# Patient Record
Sex: FEMALE | NOT HISPANIC OR LATINO | Employment: FULL TIME | URBAN - METROPOLITAN AREA
[De-identification: names, ages, dates, MRNs, and addresses within clinical notes are randomized per-mention and may not be internally consistent; named-entity substitution may affect disease eponyms.]

---

## 2023-02-24 LAB — SARS-COV-2 RESULT: NOT DETECTED

## 2023-10-19 ENCOUNTER — LAB REQUISITION (OUTPATIENT)
Dept: LAB | Facility: HOSPITAL | Age: 28
End: 2023-10-19

## 2023-10-19 DIAGNOSIS — J06.9 ACUTE UPPER RESPIRATORY INFECTION, UNSPECIFIED: ICD-10-CM

## 2023-10-19 PROCEDURE — 87635 SARS-COV-2 COVID-19 AMP PRB: CPT

## 2023-10-20 ENCOUNTER — LAB REQUISITION (OUTPATIENT)
Dept: LAB | Facility: HOSPITAL | Age: 28
End: 2023-10-20
Payer: COMMERCIAL

## 2023-10-20 DIAGNOSIS — J06.9 ACUTE UPPER RESPIRATORY INFECTION, UNSPECIFIED: ICD-10-CM

## 2023-10-20 LAB — SARS-COV-2 RNA RESP QL NAA+PROBE: NOT DETECTED

## 2023-11-20 ENCOUNTER — APPOINTMENT (OUTPATIENT)
Dept: OBSTETRICS AND GYNECOLOGY | Facility: CLINIC | Age: 28
End: 2023-11-20
Payer: COMMERCIAL

## 2023-11-22 ENCOUNTER — APPOINTMENT (OUTPATIENT)
Dept: OBSTETRICS AND GYNECOLOGY | Facility: CLINIC | Age: 28
End: 2023-11-22
Payer: COMMERCIAL

## 2023-11-27 ENCOUNTER — INITIAL PRENATAL (OUTPATIENT)
Dept: OBSTETRICS AND GYNECOLOGY | Facility: CLINIC | Age: 28
End: 2023-11-27
Payer: COMMERCIAL

## 2023-11-27 VITALS — WEIGHT: 144.2 LBS | BODY MASS INDEX: 24.75 KG/M2 | DIASTOLIC BLOOD PRESSURE: 83 MMHG | SYSTOLIC BLOOD PRESSURE: 119 MMHG

## 2023-11-27 DIAGNOSIS — Z34.81 ENCOUNTER FOR SUPERVISION OF OTHER NORMAL PREGNANCY IN FIRST TRIMESTER (HHS-HCC): Primary | ICD-10-CM

## 2023-11-27 PROCEDURE — 87800 DETECT AGNT MULT DNA DIREC: CPT | Performed by: ADVANCED PRACTICE MIDWIFE

## 2023-11-27 PROCEDURE — 87086 URINE CULTURE/COLONY COUNT: CPT | Performed by: ADVANCED PRACTICE MIDWIFE

## 2023-11-27 PROCEDURE — 0500F INITIAL PRENATAL CARE VISIT: CPT | Performed by: ADVANCED PRACTICE MIDWIFE

## 2023-11-27 ASSESSMENT — EDINBURGH POSTNATAL DEPRESSION SCALE (EPDS)
I HAVE FELT SCARED OR PANICKY FOR NO GOOD REASON: NO, NOT MUCH
I HAVE BEEN ANXIOUS OR WORRIED FOR NO GOOD REASON: HARDLY EVER
I HAVE LOOKED FORWARD WITH ENJOYMENT TO THINGS: AS MUCH AS I EVER DID
I HAVE BLAMED MYSELF UNNECESSARILY WHEN THINGS WENT WRONG: NO, NEVER
I HAVE BEEN SO UNHAPPY THAT I HAVE HAD DIFFICULTY SLEEPING: NOT VERY OFTEN
I HAVE FELT SAD OR MISERABLE: NO, NOT AT ALL
I HAVE BEEN SO UNHAPPY THAT I HAVE BEEN CRYING: NO, NEVER
TOTAL SCORE: 4
I HAVE BEEN ABLE TO LAUGH AND SEE THE FUNNY SIDE OF THINGS: AS MUCH AS I ALWAYS COULD
THINGS HAVE BEEN GETTING ON TOP OF ME: NO, MOST OF THE TIME I HAVE COPED QUITE WELL
THE THOUGHT OF HARMING MYSELF HAS OCCURRED TO ME: NEVER

## 2023-11-27 NOTE — PROGRESS NOTES
Subjective   Patient ID [id number]   Radha Epstein is a 28 y.o.  at Unknown with a working estimated date of delivery of Not found. who presents for an initial prenatal visit. This pregnancy is planned.      Endorses some nausea, no vomiting. Declines anti-emetics.  Denies abd pain or VB.   Fairly certain on LMP, regular monthly periods.   Not yet taking PNVs, awaiting their delivery. Has been taking  vitamins since having daughter.     Her pregnancy is complicated by:  -thus far uncomplicated    OB History    Para Term  AB Living   2 1 1     1   SAB IAB Ectopic Multiple Live Births           1      # Outcome Date GA Lbr Vasu/2nd Weight Sex Delivery Anes PTL Lv   2 Current            1 Term 23    F Vag-Spont EPI N JASPAL     [place]  Depression Scale Total: 4    Objective   Physical Exam  Weight: 65.4 kg (144 lb 3.2 oz)  Expected Total Weight Gain: Could not be calculated   Pregravid BMI: Could not be calculated  BP: 119/83          Physical Exam  Constitutional:       Appearance: Normal appearance.   Pulmonary:      Effort: Pulmonary effort is normal.   Abdominal:      General: Abdomen is flat. There is no distension.      Palpations: Abdomen is soft.      Tenderness: There is no abdominal tenderness.   Neurological:      General: No focal deficit present.      Mental Status: She is alert and oriented to person, place, and time.   Psychiatric:         Mood and Affect: Mood normal.         Behavior: Behavior normal.         Prenatal Labs  Ordered, standard panel    Assessment/Plan       Prenatal Labs ordered.  Viability scan ordered.  Recommended pt confirm her  vitamins have sufficient folic acid or to  generic PNV from the store today if not.  First trimester screening and second trimester screening discussed. Patient decided to proceed with cfDNA.  Return precautions reviewed.  Follow up in 4 weeks for return OB visit.

## 2023-11-28 ENCOUNTER — TELEPHONE (OUTPATIENT)
Dept: RADIOLOGY | Facility: CLINIC | Age: 28
End: 2023-11-28
Payer: COMMERCIAL

## 2023-11-28 ENCOUNTER — APPOINTMENT (OUTPATIENT)
Dept: RADIOLOGY | Facility: CLINIC | Age: 28
End: 2023-11-28
Payer: COMMERCIAL

## 2023-11-28 LAB
C TRACH RRNA SPEC QL NAA+PROBE: NEGATIVE
N GONORRHOEA DNA SPEC QL PROBE+SIG AMP: NEGATIVE

## 2023-11-28 NOTE — TELEPHONE ENCOUNTER
This patient wants to talk to a nurse cause her  has covid but she test negative for it but she need to talk to her nurse

## 2023-11-28 NOTE — TELEPHONE ENCOUNTER
Spoke with patient states her  has COVID, she has tested negative twice.  Has had mild cold symptoms since Thursday/Friday, including congestion and cough, denies fever.  Reviewed medications safe in pregnancy including zrytec, mucinex, delsym and tylenol. Patient advised to call back if worsening symptoms.

## 2023-11-29 LAB — BACTERIA UR CULT: NO GROWTH

## 2023-12-05 ENCOUNTER — APPOINTMENT (OUTPATIENT)
Dept: RADIOLOGY | Facility: CLINIC | Age: 28
End: 2023-12-05
Payer: COMMERCIAL

## 2023-12-08 ENCOUNTER — ANCILLARY PROCEDURE (OUTPATIENT)
Dept: RADIOLOGY | Facility: CLINIC | Age: 28
End: 2023-12-08
Payer: COMMERCIAL

## 2023-12-08 DIAGNOSIS — Z34.81 ENCOUNTER FOR SUPERVISION OF OTHER NORMAL PREGNANCY IN FIRST TRIMESTER (HHS-HCC): ICD-10-CM

## 2023-12-08 PROCEDURE — 76801 OB US < 14 WKS SINGLE FETUS: CPT

## 2023-12-08 PROCEDURE — 76801 OB US < 14 WKS SINGLE FETUS: CPT | Performed by: OBSTETRICS & GYNECOLOGY

## 2023-12-11 ENCOUNTER — TRANSCRIBE ORDERS (OUTPATIENT)
Dept: OBSTETRICS AND GYNECOLOGY | Facility: CLINIC | Age: 28
End: 2023-12-11
Payer: COMMERCIAL

## 2023-12-11 DIAGNOSIS — Z32.01 PREGNANCY TEST POSITIVE (HHS-HCC): Primary | ICD-10-CM

## 2023-12-13 ENCOUNTER — LAB (OUTPATIENT)
Dept: LAB | Facility: LAB | Age: 28
End: 2023-12-13
Payer: COMMERCIAL

## 2023-12-13 DIAGNOSIS — Z34.81 ENCOUNTER FOR SUPERVISION OF OTHER NORMAL PREGNANCY IN FIRST TRIMESTER (HHS-HCC): ICD-10-CM

## 2023-12-13 LAB
ABO GROUP (TYPE) IN BLOOD: NORMAL
ANTIBODY SCREEN: NORMAL
ERYTHROCYTE [DISTWIDTH] IN BLOOD BY AUTOMATED COUNT: 11.5 % (ref 11.5–14.5)
HBV SURFACE AG SERPL QL IA: NONREACTIVE
HCT VFR BLD AUTO: 38.2 % (ref 36–46)
HCV AB SER QL: NONREACTIVE
HGB BLD-MCNC: 13.2 G/DL (ref 12–16)
HIV 1+2 AB+HIV1 P24 AG SERPL QL IA: NONREACTIVE
MCH RBC QN AUTO: 31 PG (ref 26–34)
MCHC RBC AUTO-ENTMCNC: 34.6 G/DL (ref 32–36)
MCV RBC AUTO: 90 FL (ref 80–100)
NRBC BLD-RTO: 0 /100 WBCS (ref 0–0)
PLATELET # BLD AUTO: 237 X10*3/UL (ref 150–450)
RBC # BLD AUTO: 4.26 X10*6/UL (ref 4–5.2)
REFLEX ADDED, ANEMIA PANEL: NORMAL
RH FACTOR (ANTIGEN D): NORMAL
RUBV IGG SERPL IA-ACNC: 0.6 IA
RUBV IGG SERPL QL IA: NEGATIVE
T PALLIDUM AB SER QL: NONREACTIVE
WBC # BLD AUTO: 6.7 X10*3/UL (ref 4.4–11.3)

## 2023-12-13 PROCEDURE — 87389 HIV-1 AG W/HIV-1&-2 AB AG IA: CPT

## 2023-12-13 PROCEDURE — 36415 COLL VENOUS BLD VENIPUNCTURE: CPT

## 2023-12-13 PROCEDURE — 86900 BLOOD TYPING SEROLOGIC ABO: CPT

## 2023-12-13 PROCEDURE — 86850 RBC ANTIBODY SCREEN: CPT

## 2023-12-13 PROCEDURE — 87340 HEPATITIS B SURFACE AG IA: CPT

## 2023-12-13 PROCEDURE — 86803 HEPATITIS C AB TEST: CPT

## 2023-12-13 PROCEDURE — 85027 COMPLETE CBC AUTOMATED: CPT

## 2023-12-13 PROCEDURE — 86780 TREPONEMA PALLIDUM: CPT

## 2023-12-13 PROCEDURE — 86901 BLOOD TYPING SEROLOGIC RH(D): CPT

## 2023-12-13 PROCEDURE — 86317 IMMUNOASSAY INFECTIOUS AGENT: CPT

## 2023-12-18 ENCOUNTER — APPOINTMENT (OUTPATIENT)
Dept: OBSTETRICS AND GYNECOLOGY | Facility: CLINIC | Age: 28
End: 2023-12-18
Payer: COMMERCIAL

## 2023-12-22 NOTE — PROGRESS NOTES
Assessment/Plan   Problem List Items Addressed This Visit    None  Visit Diagnoses         Codes    Prenatal care, subsequent pregnancy in first trimester    -  Primary Z34.81    12 weeks gestation of pregnancy     Z3A.12            Reviewed NIPT results  Follow up in 4 weeks for a routine prenatal visit.    JERSEY Lester-MATTHIAS    Subjective   Radha Epstein is a 28 y.o.  at 11w4d with a working estimated date of delivery of 2024, by Ultrasound who presents for a routine prenatal visit. She denies vaginal bleeding or abdominal pain.  Still having some nausea and fatigue.    Her pregnancy is complicated by:  Pregnancy Problems (from 23 to present)       No problems associated with this episode.             Objective   Physical Exam   , Pregravid BMI: 24.71  TW.091 kg (3.2 oz)   Expected Total Weight Gain: 11.5 kg (25 lb)-16 kg (35 lb)             Postpartum Depression: Low Risk  (2023)    [place]  Depression Scale     Last EPDS Total Score: 4     Last EPDS Self Harm Result: Never        Prenatal Labs  Lab Results   Component Value Date    HGB 13.2 2023    HCT 38.2 2023     2023    ABO O 2023    LABRH POS 2023    NEISSGONOAMP Negative 2023    CHLAMTRACAMP Negative 2023    SYPHT Nonreactive 2023    HEPBSAG Nonreactive 2023    HIV1X2 Nonreactive 2023    URINECULTURE No growth 2023     NIPT: completed and resulted    Education provided   Ligament pain does hurt!  It also tells us that the uterus is growing appropriately - remember your maternity          support belt.  2.    Nausea and vomiting usually lessen by 12 weeks, completely goes away by 16 weeks  3.    Headaches are common and Tylenol is ok to take during pregnancy as long as you take less than 4 grams per day  4.    Make sure you are drinking plenty of water

## 2023-12-26 ENCOUNTER — ROUTINE PRENATAL (OUTPATIENT)
Dept: OBSTETRICS AND GYNECOLOGY | Facility: CLINIC | Age: 28
End: 2023-12-26
Payer: COMMERCIAL

## 2023-12-26 VITALS — WEIGHT: 143 LBS | SYSTOLIC BLOOD PRESSURE: 104 MMHG | DIASTOLIC BLOOD PRESSURE: 66 MMHG | BODY MASS INDEX: 24.55 KG/M2

## 2023-12-26 DIAGNOSIS — Z34.81 PRENATAL CARE, SUBSEQUENT PREGNANCY IN FIRST TRIMESTER (HHS-HCC): Primary | ICD-10-CM

## 2023-12-26 DIAGNOSIS — Z3A.12 12 WEEKS GESTATION OF PREGNANCY (HHS-HCC): ICD-10-CM

## 2023-12-26 PROCEDURE — 0501F PRENATAL FLOW SHEET: CPT | Performed by: ADVANCED PRACTICE MIDWIFE

## 2023-12-28 ENCOUNTER — APPOINTMENT (OUTPATIENT)
Dept: RADIOLOGY | Facility: CLINIC | Age: 28
End: 2023-12-28
Payer: COMMERCIAL

## 2024-01-03 ENCOUNTER — PROCEDURE VISIT (OUTPATIENT)
Dept: RADIOLOGY | Facility: CLINIC | Age: 29
End: 2024-01-03
Payer: COMMERCIAL

## 2024-01-03 DIAGNOSIS — Z32.01 PREGNANCY TEST POSITIVE (HHS-HCC): ICD-10-CM

## 2024-01-03 PROCEDURE — 76816 OB US FOLLOW-UP PER FETUS: CPT

## 2024-01-03 PROCEDURE — 76816 OB US FOLLOW-UP PER FETUS: CPT | Performed by: OBSTETRICS & GYNECOLOGY

## 2024-01-08 ENCOUNTER — ROUTINE PRENATAL (OUTPATIENT)
Dept: OBSTETRICS AND GYNECOLOGY | Facility: CLINIC | Age: 29
End: 2024-01-08
Payer: COMMERCIAL

## 2024-01-08 VITALS — DIASTOLIC BLOOD PRESSURE: 74 MMHG | WEIGHT: 144.3 LBS | SYSTOLIC BLOOD PRESSURE: 109 MMHG | BODY MASS INDEX: 24.77 KG/M2

## 2024-01-08 DIAGNOSIS — Z34.81 PRENATAL CARE, SUBSEQUENT PREGNANCY IN FIRST TRIMESTER (HHS-HCC): Primary | ICD-10-CM

## 2024-01-08 DIAGNOSIS — Z3A.14 14 WEEKS GESTATION OF PREGNANCY (HHS-HCC): ICD-10-CM

## 2024-01-08 PROCEDURE — 0501F PRENATAL FLOW SHEET: CPT | Performed by: ADVANCED PRACTICE MIDWIFE

## 2024-01-08 NOTE — PATIENT INSTRUCTIONS
TAKING GOOD CARE OF YOURSELF WHILE YOU ARE PREGNANT    What Should I Eat?  You do not have to eat a lot more food during pregnancy. But it is important to eat the right food--the most  healthy food for you and your baby. Every day, make sure you have:  6 to 8 large glasses of water.  6 to 9 servings of whole grain foods like bread or pasta. By reading the label, you will know that you are getting ‘‘whole’’ grain and not just brown-colored bread or pasta (1 slice of bread or a half cup of cooked pasta is a serving).  3 to 4 servings of fruit. Fresh, raw fruit is best (1 small apple or a half cup of chopped fruit is a serving).  4 to 5 servings of vegetables (1 medium carrot or a half cup of chopped vegetables is a serving).  2 to 3 servings of lean meat, fish, eggs, or nuts. (A piece ofmeat the size of a pack of playing cards is 1 serving.)  1 serving of vitamin C-rich food, like oranges, sweet peppers, or tomatoes (one half cup is a serving).  2 to 3 servings of iron-rich foods, like black-eyed peas, sweet potatoes, greens, dried fruit, or meat.  1 serving of a food rich in folic acid, like dark green, leafy vegetables (one half cup is a serving).    Are Some Foods Dangerous?  Most women can eat any food they want while they are pregnant. But there are some foods that can be  dangerous to the health of your baby.    Fish -- Fish is good food. And it is an important food for growing a smart baby. But some fish have lots of dangerous chemicals. To avoid these chemicals:  Do not eat swordfish, shark, bon mackerel, or tilefish.  Eat salmon no more than 1 time per week.  Eat only ‘‘light’’ tuna. Do not eat albacore tuna.    Milk and cheese -- Dairy products are an important source of calcium, and calcium helps build strong bones and teeth. But some dairy products carry dangerous germs. To keep yourself and your baby safe, eat and drink only dairy products--such as milk, yogurt, and cheese--that are  pasteurized.    Prepared foods -- Any food that is spoiled or not cooked well can make you sick.  Do not eat any meat or fish that has not been cooked all the way through.  Do not eat any cooked food that has not been kept hot or chilled.  Wash knives, cutting boards, and your hands between handling raw meat and any other food--like fruits and vegetables--that you plan to eat raw.  Wash all fruits and vegetables with 1 tablespoon of vinegar in a pan of water to kill germs before you eat them.    Alcohol -- We know that alcohol is dangerous for your baby if you drink a lot during your pregnancy. It is safest to avoid all alcohol.    Coffee -- The most recent studies say that 2 cups of caffeinated drink each day is safe during pregnancy. This means 2 small cups of coffee or tea or 1 can of caffeinated soda.    Weight Gain  On average, the total amount of weight gain during pregnancy will fall in the following ranges:    Weight Type Average Pounds   Normal weight (BMI 18.5 - 24.9) 25 - 35 pounds   Underweight (BMI less than 18.5) 28 - 40 pounds   Overweight (BMI 25 - 29.9) 15 - 25 pounds   Obese (BMI greater or equal to 30) 11 - 20 pounds       Do I Need to Take Vitamins?  Even if your diet is good, a daily multivitamin is a good idea. All prenatal vitamins are pretty much the same,  so buy the cheapest kind. If you find that your vitamins upset your stomach, take a children’s chewable or gummy  vitamin. Be sure you get at least 400 micrograms of folic acid every day in the vitamin you chose. The number  of micrograms of folic acid is on the label of the bottle.    Is Exercise Important?  Yes! You are getting ready for an athletic event: labor! Daily exercise will help you stay fit, control your  weight, and be prepared for labor. Every day, try to get at least 30 minutes of moderate exercise like walking  or swimming. Do deep squats several times a day. This exercise will help control low back pain and help  prepare  your pelvis for delivery.    Are Some Exercises Dangerous?  You can continue to do pretty much any exercise you have been doing. It is important to avoid any danger of  blows to your stomach. You should avoid scuba diving and contact sports.    What if I Get Sick--Can I Take Medicine?  It is important to limit the medicines you take as much as possible. It is safe to take acetaminophen  (Tylenol). Avoid NSAIDs like ibuprofen (Motrin) and naproxen (Aleve).    Head cold -- Drink lots of fluids, gargle with warm salt water, take warm baths or showers, take Tylenol for headache and sore throat, suck on throat lozenges    Headaches -- Drink at least 8 big glasses of water every day, eat something healthy every 2 to 3 hours during the day, and take Tylenol    Constipation -- Drink lots of water, eat lots of fruits and vegetables, including dried fruits like prunes, and use a fiber supplement like Metamucil    Are There Danger Signs That I Need to Watch Out For?  Call your health care provider if:  You start to bleed like a period  You are leaking fluid  Your baby is not moving (after 24 weeks into your pregnancy)  You are having very bad headaches or your vision is blurry or you see ‘‘spots’’  You are having very bad pain  You are feeling very frightened or sad  You are very worried about something    Our contact information is below in case you or your family need to call:  Your health care provider’s name: AMELIA Albarado  Your health care provider’s phone number: [phone]

## 2024-01-08 NOTE — PROGRESS NOTES
PLAN:  No problem-specific Assessment & Plan notes found for this encounter.    ASSESSESMENT:  1. Prenatal care, subsequent pregnancy in first trimester            She is here for 14w0d OB visit.  Denies cramping, leaking of fluid, or bleeding   NOB labs reviewed and WNL.   Reviewed NT US with patient NT not able to be measured - reassuring with normal NIPT  Provided reassurance  PHYSICAL EXAM:   /74   Wt 65.5 kg (144 lb 4.8 oz)   LMP 10/03/2023 (Within Days)   BMI 24.77 kg/m²   I have reviewed her pertinent history, lab results, medications and problem list.  See Pregnancy episode for any changes.  Well appearing, Alert & oriented  Skin warm & dry  Normal range of motion in all extremities.    Abdomen soft  nontender  Normal resp effort    AMELIA Albarado   01/08/24    Follow up in about 4 weeks (around 2/5/2024).

## 2024-01-15 ENCOUNTER — APPOINTMENT (OUTPATIENT)
Dept: OBSTETRICS AND GYNECOLOGY | Facility: CLINIC | Age: 29
End: 2024-01-15
Payer: COMMERCIAL

## 2024-02-05 ENCOUNTER — ROUTINE PRENATAL (OUTPATIENT)
Dept: OBSTETRICS AND GYNECOLOGY | Facility: CLINIC | Age: 29
End: 2024-02-05
Payer: COMMERCIAL

## 2024-02-05 VITALS — SYSTOLIC BLOOD PRESSURE: 116 MMHG | DIASTOLIC BLOOD PRESSURE: 75 MMHG | WEIGHT: 151.1 LBS | BODY MASS INDEX: 25.94 KG/M2

## 2024-02-05 DIAGNOSIS — Z3A.18 18 WEEKS GESTATION OF PREGNANCY (HHS-HCC): Primary | ICD-10-CM

## 2024-02-05 DIAGNOSIS — Z34.82 ENCOUNTER FOR SUPERVISION OF OTHER NORMAL PREGNANCY IN SECOND TRIMESTER (HHS-HCC): ICD-10-CM

## 2024-02-05 PROCEDURE — 0501F PRENATAL FLOW SHEET: CPT | Performed by: ADVANCED PRACTICE MIDWIFE

## 2024-02-05 NOTE — PROGRESS NOTES
"Subjective   Patient ID [id number]   Radha Epstein is a 29 y.o.  at 18w0d with a working estimated date of delivery of 2024, by Ultrasound who presents for a routine prenatal visit. She denies vaginal bleeding, leakage of fluid, decreased fetal movements, or contractions. Starting to feel fetal movement!    Endorses increase in nosebleeds lately.     Her pregnancy is complicated by:  -thus far uncomplicated    Objective   Physical Exam  Weight: 68.5 kg (151 lb 1.6 oz)  Expected Total Weight Gain: 11.5 kg (25 lb)-16 kg (35 lb)   Pregravid BMI: 24.71  BP: 116/75         Prenatal Labs  Urine dip:  No results found for: \"KETONESU\", \"GLUCOSEUR\", \"LEUKOCYTESUR\"    Lab Results   Component Value Date    HGB 13.2 2023    HCT 38.2 2023    ABO O 2023    HEPBSAG Nonreactive 2023       Assessment/Plan   Diagnoses and all orders for this visit:  18 weeks gestation of pregnancy  Encounter for supervision of other normal pregnancy in second trimester      Continue prenatal vitamin.  Labs reviewed.  Rhogam not indicated.  Anatomy US next week.   Warning signs reviewed.   Follow up in 4 weeks for a routine prenatal visit.  "

## 2024-02-12 ENCOUNTER — HOSPITAL ENCOUNTER (OUTPATIENT)
Dept: RADIOLOGY | Facility: CLINIC | Age: 29
Discharge: HOME | End: 2024-02-12
Payer: COMMERCIAL

## 2024-02-12 DIAGNOSIS — Z32.01 PREGNANCY TEST POSITIVE (HHS-HCC): ICD-10-CM

## 2024-02-12 PROCEDURE — 76805 OB US >/= 14 WKS SNGL FETUS: CPT

## 2024-02-12 PROCEDURE — 76817 TRANSVAGINAL US OBSTETRIC: CPT | Performed by: MEDICAL GENETICS

## 2024-02-12 PROCEDURE — 76815 OB US LIMITED FETUS(S): CPT | Performed by: MEDICAL GENETICS

## 2024-02-19 ENCOUNTER — APPOINTMENT (OUTPATIENT)
Dept: OBSTETRICS AND GYNECOLOGY | Facility: CLINIC | Age: 29
End: 2024-02-19
Payer: COMMERCIAL

## 2024-02-27 ENCOUNTER — ANCILLARY ORDERS (OUTPATIENT)
Dept: OBSTETRICS AND GYNECOLOGY | Facility: CLINIC | Age: 29
End: 2024-02-27
Payer: COMMERCIAL

## 2024-02-27 ENCOUNTER — HOSPITAL ENCOUNTER (OUTPATIENT)
Dept: RADIOLOGY | Facility: CLINIC | Age: 29
Discharge: HOME | End: 2024-02-27
Payer: COMMERCIAL

## 2024-02-27 DIAGNOSIS — Z32.01 PREGNANCY TEST POSITIVE (HHS-HCC): Primary | ICD-10-CM

## 2024-02-27 DIAGNOSIS — Z32.01 PREGNANCY TEST POSITIVE (HHS-HCC): ICD-10-CM

## 2024-02-27 PROBLEM — Z34.80 SUPERVISION OF OTHER NORMAL PREGNANCY, ANTEPARTUM (HHS-HCC): Status: ACTIVE | Noted: 2024-02-27

## 2024-02-27 PROBLEM — O44.40 LOW-LYING PLACENTA (HHS-HCC): Status: ACTIVE | Noted: 2024-02-27

## 2024-02-27 PROCEDURE — 76815 OB US LIMITED FETUS(S): CPT

## 2024-02-27 PROCEDURE — 76815 OB US LIMITED FETUS(S): CPT | Performed by: STUDENT IN AN ORGANIZED HEALTH CARE EDUCATION/TRAINING PROGRAM

## 2024-02-27 PROCEDURE — 76817 TRANSVAGINAL US OBSTETRIC: CPT | Performed by: STUDENT IN AN ORGANIZED HEALTH CARE EDUCATION/TRAINING PROGRAM

## 2024-02-27 PROCEDURE — 76817 TRANSVAGINAL US OBSTETRIC: CPT

## 2024-03-04 ENCOUNTER — ROUTINE PRENATAL (OUTPATIENT)
Dept: OBSTETRICS AND GYNECOLOGY | Facility: CLINIC | Age: 29
End: 2024-03-04
Payer: COMMERCIAL

## 2024-03-04 VITALS — BODY MASS INDEX: 26.26 KG/M2 | DIASTOLIC BLOOD PRESSURE: 69 MMHG | SYSTOLIC BLOOD PRESSURE: 115 MMHG | WEIGHT: 153 LBS

## 2024-03-04 DIAGNOSIS — Z34.82 ENCOUNTER FOR SUPERVISION OF OTHER NORMAL PREGNANCY IN SECOND TRIMESTER (HHS-HCC): Primary | ICD-10-CM

## 2024-03-04 DIAGNOSIS — O99.891 BACK PAIN IN PREGNANCY (HHS-HCC): ICD-10-CM

## 2024-03-04 DIAGNOSIS — M54.9 BACK PAIN IN PREGNANCY (HHS-HCC): ICD-10-CM

## 2024-03-04 DIAGNOSIS — Z3A.22 22 WEEKS GESTATION OF PREGNANCY (HHS-HCC): ICD-10-CM

## 2024-03-04 PROCEDURE — 0501F PRENATAL FLOW SHEET: CPT | Performed by: ADVANCED PRACTICE MIDWIFE

## 2024-03-04 NOTE — PROGRESS NOTES
Assessment/Plan       Growth US scheduled for 30.  Discussed diabetes screening and routine labs, to be completed next visit  2nd trimester labs ordered, reviewed 24-28 week recommended window to have drawn.   Chiropractor referral placed, plans to return to [facility] which she liked before.   Reviewed s/sx of PTL, warning signs, fetal movement counts, and when to call provider.  Scheduled follow-up for low-lying placenta at 30 wks.  Follow up in 4 weeks for a routine prenatal visit.    AMELIA Milton    Kelsey Epstein is a 29 y.o.  at 22w0d with a working estimated date of delivery of 2024, by Ultrasound who presents for a routine prenatal visit. She denies vaginal bleeding, leakage of fluid, decreased fetal movements, or contractions.    Her pregnancy is complicated by:  Pregnancy Problems (from 23 to present)       Problem Noted Resolved    Supervision of other normal pregnancy, antepartum 2024 by AMELIA Milton No    Priority:  Medium      Overview Signed 2024  2:14 PM by AMELIA Milton     Desired provider in labor: [x] CNM  [] Physician  [x] Initial BMI: 24.71  [x] Prenatal Labs: wnl  [x] Rh status: positive  [x] Genetic Screening:  RR cfDNA GIRL  [] Baby ASA  [x] Dating confirmation: US at 9.4 Wks on 23    [x] Anatomy US: views initially incomplete--> anatomy wnl though placenta posterior, low-lying, plan for FU at 30 wks  [] Patient added to BirthTracks  [] 1hr GCT at 24-28wks:  [] 3 hr GTT (if indicated):  [] Rhogam (if indicated):   [] Fetal Surveillance (if indicated):    [] Tdap (27-36wks):  [] RSV (32-36wks)  [] Flu Shot:  [] COVID vaccine:     [] Breastfeeding  [] Pain management during labor:   [] Postpartum Birth control method:     [] GBS at 36 wks:  [] 39 weeks discussion of IOL vs. Expectant management:  [] Mode of delivery:  pending follow-up on low-lying placenta                  Objective    Physical Exam  Weight: 69.4 kg (153 lb)  TW.082 kg (9 lb)  Expected Total Weight Gain: 11.5 kg (25 lb)-16 kg (35 lb)   Pregravid BMI: 24.71  BP: 115/69         Postpartum Depression: Low Risk  (2023)    [place]  Depression Scale     Last EPDS Total Score: 4     Last EPDS Self Harm Result: Never       Prenatal Labs  Lab Results   Component Value Date    HGB 13.2 2023    HCT 38.2 2023     2023    ABO O 2023    LABRH POS 2023    NEISSGONOAMP Negative 2023    CHLAMTRACAMP Negative 2023    SYPHT Nonreactive 2023    HEPBSAG Nonreactive 2023    HIV1X2 Nonreactive 2023    URINECULTURE No growth 2023    GLUC1P 77 2022       Imaging  The most recent ultrasound was performed on  with a study GA of 21.1--> low-lying placenta, plan for FU at 30 wks.

## 2024-03-07 ENCOUNTER — TELEPHONE (OUTPATIENT)
Dept: RADIOLOGY | Facility: CLINIC | Age: 29
End: 2024-03-07
Payer: COMMERCIAL

## 2024-03-07 NOTE — TELEPHONE ENCOUNTER
Patient requesting name of therapist that Antonina had seen. Discussed referral was placed for [facility]. Patient states has seen someone there before and will schedule with them again. No need to ask Antonina her previous therapist.

## 2024-03-07 NOTE — TELEPHONE ENCOUNTER
This is one of Antonina Infante patient and she seen Antonina Monday and she said Antonina gave her a name of her chiropractor

## 2024-03-14 ENCOUNTER — ALLIED HEALTH (OUTPATIENT)
Dept: INTEGRATIVE MEDICINE | Facility: CLINIC | Age: 29
End: 2024-03-14
Payer: COMMERCIAL

## 2024-03-14 DIAGNOSIS — M79.9 POSTURAL STRAIN: ICD-10-CM

## 2024-03-14 DIAGNOSIS — M79.10 MYALGIA: ICD-10-CM

## 2024-03-14 DIAGNOSIS — M54.9 BACK PAIN IN PREGNANCY (HHS-HCC): ICD-10-CM

## 2024-03-14 DIAGNOSIS — M99.01 SEGMENTAL AND SOMATIC DYSFUNCTION OF CERVICAL REGION: ICD-10-CM

## 2024-03-14 DIAGNOSIS — O99.891 BACK PAIN IN PREGNANCY (HHS-HCC): ICD-10-CM

## 2024-03-14 DIAGNOSIS — M99.02 SEGMENTAL AND SOMATIC DYSFUNCTION OF THORACIC REGION: ICD-10-CM

## 2024-03-14 DIAGNOSIS — M99.05 SEGMENTAL AND SOMATIC DYSFUNCTION OF PELVIC REGION: ICD-10-CM

## 2024-03-14 DIAGNOSIS — M99.03 SEGMENTAL AND SOMATIC DYSFUNCTION OF LUMBAR REGION: Primary | ICD-10-CM

## 2024-03-14 PROCEDURE — 98941 CHIROPRACT MANJ 3-4 REGIONS: CPT | Performed by: CHIROPRACTOR

## 2024-03-14 NOTE — PROGRESS NOTES
Subjective   Patient ID: Radha Epstein is a 29 y.o. female who presents March 14, 2024 for low back pain.    (1/25) [facility]  JAZMYN: 7/8/24    Today, the patient rates their degree of pain as a 4 out of 10 on the numeric pain rating scale.     Radha returns for continued treatment of low back pain. Patient states that she is 23 weeks pregnant and has a flare up of low back pain. She states that they are similar to symptoms she had during her last pregnancy. She states that she was treated by Dr. Escamilla during her previous pregnancy and noted relief in symptoms. She states that pain goes across the low back, L>R. She states that she has neck/upper back and mid back stiffness. She states that she feels like she just needs to be cracked everywhere. Denies any associated symptoms or change in medical history, other than giving birth, since last visit and will follow up in 2 weeks.            Objective   Physical Exam  Constitutional:       General: She is not in acute distress.     Appearance: Normal appearance.       HENT:      Head: Normocephalic and atraumatic.   Neurological:      General: No focal deficit present.      Mental Status: She is alert and oriented to person, place, and time.      Cranial Nerves: No dysarthria or facial asymmetry.      Sensory: Sensation is intact.      Motor: Motor function is intact.      Coordination: Coordination is intact.      Gait: Gait is intact.   Psychiatric:         Attention and Perception: Attention normal.         Mood and Affect: Mood normal.         Speech: Speech normal.         Behavior: Behavior is cooperative.         Palpation of the following region(s) revealed:  Cervical: Upper trapezius bilateral, hypertonicity and tenderness.  Levator scapulae bilateral, hypertonicity and tenderness.  Cervical paraspinals bilateral, hypertonicity and tenderness.  Thoracic: Thoracic paraspinals bilateral, hypertonicity and tenderness.  Lumbar: Lumbar paraspinals bilateral,  hypertonicity and tenderness.  Quadratus lumborum bilateral, hypertonicity and tenderness.        Segmental Joint(s): Segmental joint dysfunction was assessed with motion palpation and is identified in the following areas:  Cervical : C5 C6  Thoracic : T3, T7, and T10  Lumbopelvic / Sacral SIJ : L4, L5, L5/S1, and L SIJ  Upper / Lower Extremities : R Hip and L Hip      Assessment/Plan   Today's Treatment Included: Chiropractic manipulation to the Segmental Joint(s) Cervical : C5 C6 Segmental Joint(s) Lumbopelvic/Sacral SIJ : L4, L5, L5/S1, and L SIJ Segmental Joint(s) Thoracic : T3, T7, and T10 Segmental Joints Upper/Lower Extremities : R Hip and L Hip  Treatment Techniques Used : Diversified CMT and Manual traction  Ischemic compression  Soft-Tissue manipulation    Soft-tissue mobilization was performed in the following areas:   Cervical paraspinal mm. bilateral, Upper Trapezius bilateral, and Levator Scap. bilateral  Thoracic Paraspinal mm. bilateral  Lumbar Paraspinal mm. bilateral and Quadratus Lumborum bilateral            The patient tolerated today's treatment with little or no additional discomfort and was instructed to contact the office for questions or concerns.

## 2024-03-26 ENCOUNTER — ALLIED HEALTH (OUTPATIENT)
Dept: INTEGRATIVE MEDICINE | Facility: CLINIC | Age: 29
End: 2024-03-26
Payer: COMMERCIAL

## 2024-03-26 DIAGNOSIS — O99.891 BACK PAIN IN PREGNANCY (HHS-HCC): ICD-10-CM

## 2024-03-26 DIAGNOSIS — M99.05 SEGMENTAL AND SOMATIC DYSFUNCTION OF PELVIC REGION: ICD-10-CM

## 2024-03-26 DIAGNOSIS — M79.10 MYALGIA: ICD-10-CM

## 2024-03-26 DIAGNOSIS — M79.9 POSTURAL STRAIN: ICD-10-CM

## 2024-03-26 DIAGNOSIS — M99.02 SEGMENTAL AND SOMATIC DYSFUNCTION OF THORACIC REGION: ICD-10-CM

## 2024-03-26 DIAGNOSIS — M99.03 SEGMENTAL AND SOMATIC DYSFUNCTION OF LUMBAR REGION: Primary | ICD-10-CM

## 2024-03-26 DIAGNOSIS — M99.01 SEGMENTAL AND SOMATIC DYSFUNCTION OF CERVICAL REGION: ICD-10-CM

## 2024-03-26 DIAGNOSIS — M54.9 BACK PAIN IN PREGNANCY (HHS-HCC): ICD-10-CM

## 2024-03-26 NOTE — PROGRESS NOTES
Subjective   Patient ID: Radha Epstein is a 29 y.o. female who presents March 26, 2024 for low back pain.    (2/25) [facility]  JAZMYN: 7/8/24    Today, the patient rates their degree of pain as a 3 out of 10 on the numeric pain rating scale.     Radha returns for continued treatment of low back pain. Patient states that she had improvement after last visit. She states that she had mild soreness for a day or two after last visit. She states that symptoms have started to return since last week but are still better than before she presented for care. Denies any associated symptoms or change in medical history, other than giving birth, since last visit and will follow up in 2 weeks.            Objective   Physical Exam  Constitutional:       General: She is not in acute distress.     Appearance: Normal appearance.       HENT:      Head: Normocephalic and atraumatic.   Neurological:      General: No focal deficit present.      Mental Status: She is alert and oriented to person, place, and time.      Cranial Nerves: No dysarthria or facial asymmetry.      Sensory: Sensation is intact.      Motor: Motor function is intact.      Coordination: Coordination is intact.      Gait: Gait is intact.   Psychiatric:         Attention and Perception: Attention normal.         Mood and Affect: Mood normal.         Speech: Speech normal.         Behavior: Behavior is cooperative.       Palpation of the following region(s) revealed:  Cervical: Upper trapezius bilateral, hypertonicity and tenderness.  Levator scapulae bilateral, hypertonicity and tenderness.  Cervical paraspinals bilateral, hypertonicity and tenderness.  Thoracic: Thoracic paraspinals bilateral, hypertonicity and tenderness.  Lumbar: Lumbar paraspinals bilateral, hypertonicity and tenderness.  Quadratus lumborum bilateral, hypertonicity and tenderness.        Segmental Joint(s): Segmental joint dysfunction was assessed with motion palpation and is identified in the  following areas:  Cervical : C5 C6  Thoracic : T3, T7, and T10  Lumbopelvic / Sacral SIJ : L4, L5, L5/S1, and L SIJ  Upper / Lower Extremities : R Hip and L Hip      Assessment/Plan   Today's Treatment Included: Chiropractic manipulation to the Segmental Joint(s) Cervical : C5 C6 Segmental Joint(s) Lumbopelvic/Sacral SIJ : L4, L5, L5/S1, and L SIJ Segmental Joint(s) Thoracic : T3, T7, and T10 Segmental Joints Upper/Lower Extremities : R Hip and L Hip  Treatment Techniques Used : Diversified CMT and Manual traction  Ischemic compression  Soft-Tissue manipulation    Soft-tissue mobilization was performed in the following areas:   Cervical paraspinal mm. bilateral, Upper Trapezius bilateral, and Levator Scap. bilateral  Thoracic Paraspinal mm. bilateral  Lumbar Paraspinal mm. bilateral and Quadratus Lumborum bilateral            The patient tolerated today's treatment with little or no additional discomfort and was instructed to contact the office for questions or concerns.

## 2024-03-27 ENCOUNTER — APPOINTMENT (OUTPATIENT)
Dept: INTEGRATIVE MEDICINE | Facility: CLINIC | Age: 29
End: 2024-03-27
Payer: COMMERCIAL

## 2024-04-01 ENCOUNTER — ROUTINE PRENATAL (OUTPATIENT)
Dept: OBSTETRICS AND GYNECOLOGY | Facility: CLINIC | Age: 29
End: 2024-04-01
Payer: COMMERCIAL

## 2024-04-01 VITALS — DIASTOLIC BLOOD PRESSURE: 75 MMHG | SYSTOLIC BLOOD PRESSURE: 117 MMHG | WEIGHT: 156 LBS | BODY MASS INDEX: 26.78 KG/M2

## 2024-04-01 DIAGNOSIS — Z3A.26 26 WEEKS GESTATION OF PREGNANCY (HHS-HCC): ICD-10-CM

## 2024-04-01 DIAGNOSIS — Z34.80 SUPERVISION OF OTHER NORMAL PREGNANCY, ANTEPARTUM (HHS-HCC): Primary | ICD-10-CM

## 2024-04-01 PROCEDURE — 0501F PRENATAL FLOW SHEET: CPT | Performed by: ADVANCED PRACTICE MIDWIFE

## 2024-04-01 ASSESSMENT — EDINBURGH POSTNATAL DEPRESSION SCALE (EPDS)
TOTAL SCORE: 4
I HAVE FELT SAD OR MISERABLE: NO, NOT AT ALL
I HAVE BEEN SO UNHAPPY THAT I HAVE HAD DIFFICULTY SLEEPING: NOT AT ALL
I HAVE BEEN SO UNHAPPY THAT I HAVE BEEN CRYING: NO, NEVER
I HAVE BEEN ANXIOUS OR WORRIED FOR NO GOOD REASON: YES, VERY OFTEN
THE THOUGHT OF HARMING MYSELF HAS OCCURRED TO ME: NEVER
I HAVE LOOKED FORWARD WITH ENJOYMENT TO THINGS: AS MUCH AS I EVER DID
I HAVE BEEN ABLE TO LAUGH AND SEE THE FUNNY SIDE OF THINGS: AS MUCH AS I ALWAYS COULD
I HAVE BLAMED MYSELF UNNECESSARILY WHEN THINGS WENT WRONG: NOT VERY OFTEN
THINGS HAVE BEEN GETTING ON TOP OF ME: NO, I HAVE BEEN COPING AS WELL AS EVER
I HAVE FELT SCARED OR PANICKY FOR NO GOOD REASON: NO, NOT AT ALL

## 2024-04-01 NOTE — PROGRESS NOTES
Assessment/Plan   Diagnoses and all orders for this visit:  Supervision of other normal pregnancy, antepartum  26 weeks gestation of pregnancy      Growth US scheduled for 30 weeks, will follow-up on low-lying placenta.   2nd trimester labs ordered, aware of timeframe to have done.   Reviewed s/sx of PTL, warning signs, fetal movement counts, and when to call provider.  Follow up in 2 weeks for a routine prenatal visit.    AMELIA Milton    Subjective     Radha Epstein is a 29 y.o.  at 26w0d with a working estimated date of delivery of 2024, by Ultrasound who presents for a routine prenatal visit. She denies vaginal bleeding, leakage of fluid, decreased fetal movements, or contractions.    Feeling well overall. Seeing chiropractor for back and pelvic pain.     Her pregnancy is complicated by:  Pregnancy Problems (from 23 to present)       Problem Noted Resolved    Supervision of other normal pregnancy, antepartum 2024 by AMELIA Milton No    Priority:  Medium      Overview Signed 2024  2:14 PM by AMELIA Milton     Desired provider in labor: [x] CNM  [] Physician  [x] Initial BMI: 24.71  [x] Prenatal Labs: wnl  [x] Rh status: positive  [x] Genetic Screening:  RR cfDNA GIRL  [] Baby ASA  [x] Dating confirmation: US at 9.4 Wks on 23    [x] Anatomy US: views initially incomplete--> anatomy wnl though placenta posterior, low-lying, plan for FU at 30 wks  [] Patient added to BirthTracks  [] 1hr GCT at 24-28wks:  [] 3 hr GTT (if indicated):  [] Rhogam (if indicated):   [] Fetal Surveillance (if indicated):    [] Tdap (27-36wks):  [] RSV (32-36wks)  [] Flu Shot:  [] COVID vaccine:     [] Breastfeeding  [] Pain management during labor:   [] Postpartum Birth control method:     [] GBS at 36 wks:  [] 39 weeks discussion of IOL vs. Expectant management:  [] Mode of delivery:  pending follow-up on low-lying placenta         Low-lying  placenta 2024 by AMELIA Milton No    Priority:  Medium      Overview Signed 2024  2:16 PM by AMELIA Milton     Posterior, 1.2 cm from internal os  Plan for follow-up at 30 weeks                  Objective   Physical Exam  Weight: 70.8 kg (156 lb)  TW.443 kg (12 lb)  Expected Total Weight Gain: 11.5 kg (25 lb)-16 kg (35 lb)   Pregravid BMI: 24.71  BP: 117/75         Postpartum Depression: Low Risk  (2024)    [place]  Depression Scale     Last EPDS Total Score: 4     Last EPDS Self Harm Result: Never       Prenatal Labs  Lab Results   Component Value Date    HGB 13.2 2023    HCT 38.2 2023     2023    ABO O 2023    LABRH POS 2023    NEISSGONOAMP Negative 2023    CHLAMTRACAMP Negative 2023    SYPHT Nonreactive 2023    HEPBSAG Nonreactive 2023    HIV1X2 Nonreactive 2023    URINECULTURE No growth 2023    GLUC1P 77 2022

## 2024-04-10 ENCOUNTER — ALLIED HEALTH (OUTPATIENT)
Dept: INTEGRATIVE MEDICINE | Facility: CLINIC | Age: 29
End: 2024-04-10
Payer: COMMERCIAL

## 2024-04-10 ENCOUNTER — LAB (OUTPATIENT)
Dept: LAB | Facility: LAB | Age: 29
End: 2024-04-10
Payer: COMMERCIAL

## 2024-04-10 DIAGNOSIS — M79.10 MYALGIA: ICD-10-CM

## 2024-04-10 DIAGNOSIS — M99.02 SEGMENTAL AND SOMATIC DYSFUNCTION OF THORACIC REGION: ICD-10-CM

## 2024-04-10 DIAGNOSIS — M79.9 POSTURAL STRAIN: ICD-10-CM

## 2024-04-10 DIAGNOSIS — O99.891 BACK PAIN IN PREGNANCY (HHS-HCC): ICD-10-CM

## 2024-04-10 DIAGNOSIS — Z34.82 ENCOUNTER FOR SUPERVISION OF OTHER NORMAL PREGNANCY IN SECOND TRIMESTER (HHS-HCC): ICD-10-CM

## 2024-04-10 DIAGNOSIS — M99.03 SEGMENTAL AND SOMATIC DYSFUNCTION OF LUMBAR REGION: Primary | ICD-10-CM

## 2024-04-10 DIAGNOSIS — M54.9 BACK PAIN IN PREGNANCY (HHS-HCC): ICD-10-CM

## 2024-04-10 DIAGNOSIS — M99.01 SEGMENTAL AND SOMATIC DYSFUNCTION OF CERVICAL REGION: ICD-10-CM

## 2024-04-10 DIAGNOSIS — M99.05 SEGMENTAL AND SOMATIC DYSFUNCTION OF PELVIC REGION: ICD-10-CM

## 2024-04-10 LAB
ERYTHROCYTE [DISTWIDTH] IN BLOOD BY AUTOMATED COUNT: 12.8 % (ref 11.5–14.5)
GLUCOSE 1H P 50 G GLC PO SERPL-MCNC: 124 MG/DL
HCT VFR BLD AUTO: 34.1 % (ref 36–46)
HGB BLD-MCNC: 11.7 G/DL (ref 12–16)
MCH RBC QN AUTO: 32.8 PG (ref 26–34)
MCHC RBC AUTO-ENTMCNC: 34.3 G/DL (ref 32–36)
MCV RBC AUTO: 96 FL (ref 80–100)
NRBC BLD-RTO: 0 /100 WBCS (ref 0–0)
PLATELET # BLD AUTO: 190 X10*3/UL (ref 150–450)
RBC # BLD AUTO: 3.57 X10*6/UL (ref 4–5.2)
REFLEX ADDED, ANEMIA PANEL: NORMAL
TREPONEMA PALLIDUM IGG+IGM AB [PRESENCE] IN SERUM OR PLASMA BY IMMUNOASSAY: NONREACTIVE
WBC # BLD AUTO: 9.5 X10*3/UL (ref 4.4–11.3)

## 2024-04-10 PROCEDURE — 82947 ASSAY GLUCOSE BLOOD QUANT: CPT

## 2024-04-10 PROCEDURE — 86780 TREPONEMA PALLIDUM: CPT

## 2024-04-10 PROCEDURE — 36415 COLL VENOUS BLD VENIPUNCTURE: CPT

## 2024-04-10 PROCEDURE — 98941 CHIROPRACT MANJ 3-4 REGIONS: CPT | Performed by: CHIROPRACTOR

## 2024-04-10 PROCEDURE — 85027 COMPLETE CBC AUTOMATED: CPT

## 2024-04-10 NOTE — PROGRESS NOTES
Subjective   Patient ID: Radha Epstein is a 29 y.o. female who presents April 10, 2024 for low back pain.    (3/25) VPCY  JAZMYN: 7/8/24    Today, the patient rates their degree of pain as a 4 out of 10 on the numeric pain rating scale.     Radha returns for continued treatment of low back pain. Patient states that she has increased low back pain today. She states that her belly grew quickly over the past few weeks and that has effected her low back. She states that she has low back discomfort while walking, standing or sitting. She states that symptoms have moved to the left as well. Denies any associated symptoms or change in medical history since last visit and will follow up in 2 weeks.            Objective   Physical Exam  Constitutional:       General: She is not in acute distress.     Appearance: Normal appearance.       HENT:      Head: Normocephalic and atraumatic.   Neurological:      General: No focal deficit present.      Mental Status: She is alert and oriented to person, place, and time.      Cranial Nerves: No dysarthria or facial asymmetry.      Sensory: Sensation is intact.      Motor: Motor function is intact.      Coordination: Coordination is intact.      Gait: Gait is intact.   Psychiatric:         Attention and Perception: Attention normal.         Mood and Affect: Mood normal.         Speech: Speech normal.         Behavior: Behavior is cooperative.         Palpation of the following region(s) revealed:  Cervical: Upper trapezius bilateral, hypertonicity and tenderness.  Levator scapulae bilateral, hypertonicity and tenderness.  Cervical paraspinals bilateral, hypertonicity and tenderness.  Thoracic: Thoracic paraspinals bilateral, hypertonicity and tenderness.  Lumbar: Lumbar paraspinals bilateral, hypertonicity and tenderness.  Quadratus lumborum bilateral, hypertonicity and tenderness.        Segmental Joint(s): Segmental joint dysfunction was assessed with motion palpation and is  identified in the following areas:  Cervical : C5 C6  Thoracic : T3, T7, and T10  Lumbopelvic / Sacral SIJ : L4, L5, L5/S1, and L SIJ  Upper / Lower Extremities : R Hip and L Hip      Assessment/Plan   Today's Treatment Included: Chiropractic manipulation to the Segmental Joint(s) Cervical : C5 C6 Segmental Joint(s) Lumbopelvic/Sacral SIJ : L4, L5, L5/S1, and L SIJ Segmental Joint(s) Thoracic : T3, T7, and T10 Segmental Joints Upper/Lower Extremities : R Hip and L Hip  Treatment Techniques Used : Diversified CMT and Manual traction  Ischemic compression  Soft-Tissue manipulation    Soft-tissue mobilization was performed in the following areas:   Cervical paraspinal mm. bilateral, Upper Trapezius bilateral, and Levator Scap. bilateral  Thoracic Paraspinal mm. bilateral  Lumbar Paraspinal mm. bilateral and Quadratus Lumborum bilateral            The patient tolerated today's treatment with little or no additional discomfort and was instructed to contact the office for questions or concerns.

## 2024-04-24 ENCOUNTER — ALLIED HEALTH (OUTPATIENT)
Dept: INTEGRATIVE MEDICINE | Facility: CLINIC | Age: 29
End: 2024-04-24
Payer: COMMERCIAL

## 2024-04-24 DIAGNOSIS — M99.02 SEGMENTAL AND SOMATIC DYSFUNCTION OF THORACIC REGION: ICD-10-CM

## 2024-04-24 DIAGNOSIS — M79.9 POSTURAL STRAIN: ICD-10-CM

## 2024-04-24 DIAGNOSIS — M99.05 SEGMENTAL AND SOMATIC DYSFUNCTION OF PELVIC REGION: ICD-10-CM

## 2024-04-24 DIAGNOSIS — O99.891 BACK PAIN IN PREGNANCY (HHS-HCC): ICD-10-CM

## 2024-04-24 DIAGNOSIS — M54.9 BACK PAIN IN PREGNANCY (HHS-HCC): ICD-10-CM

## 2024-04-24 DIAGNOSIS — M79.10 MYALGIA: ICD-10-CM

## 2024-04-24 DIAGNOSIS — M99.03 SEGMENTAL AND SOMATIC DYSFUNCTION OF LUMBAR REGION: Primary | ICD-10-CM

## 2024-04-24 DIAGNOSIS — M99.01 SEGMENTAL AND SOMATIC DYSFUNCTION OF CERVICAL REGION: ICD-10-CM

## 2024-04-24 PROCEDURE — 98941 CHIROPRACT MANJ 3-4 REGIONS: CPT | Performed by: CHIROPRACTOR

## 2024-04-24 NOTE — PROGRESS NOTES
Subjective   Patient ID: Radha Epstein is a 29 y.o. female who presents April 24, 2024 for low back pain.    (4/25) VPCY  JAZMYN: 7/8/24    Today, the patient rates their degree of pain as a 4 out of 10 on the numeric pain rating scale.     Radha returns for continued treatment of low back pain. Patient states that she has good days and bad days. She states that yesterday she had fairly intense low back pain and today she has very mild symptoms. She states that she has improvement in neck/upper back symptoms that last between visits. Denies any associated symptoms or change in medical history since last visit and will follow up in 2 weeks.            Objective   Physical Exam  Constitutional:       General: She is not in acute distress.     Appearance: Normal appearance.       HENT:      Head: Normocephalic and atraumatic.   Neurological:      General: No focal deficit present.      Mental Status: She is alert and oriented to person, place, and time.      Cranial Nerves: No dysarthria or facial asymmetry.      Sensory: Sensation is intact.      Motor: Motor function is intact.      Coordination: Coordination is intact.      Gait: Gait is intact.   Psychiatric:         Attention and Perception: Attention normal.         Mood and Affect: Mood normal.         Speech: Speech normal.         Behavior: Behavior is cooperative.       Palpation of the following region(s) revealed:  Cervical: Upper trapezius bilateral, hypertonicity and tenderness.  Levator scapulae bilateral, hypertonicity and tenderness.  Cervical paraspinals bilateral, hypertonicity and tenderness.  Thoracic: Thoracic paraspinals bilateral, hypertonicity and tenderness.  Lumbar: Lumbar paraspinals bilateral, hypertonicity and tenderness.  Quadratus lumborum bilateral, hypertonicity and tenderness.        Segmental Joint(s): Segmental joint dysfunction was assessed with motion palpation and is identified in the following areas:  Cervical : C5  C6  Thoracic : T3, T7, and T10  Lumbopelvic / Sacral SIJ : L4, L5, L5/S1, and L SIJ  Upper / Lower Extremities : R Hip and L Hip      Assessment/Plan   Today's Treatment Included: Chiropractic manipulation to the Segmental Joint(s) Cervical : C5 C6 Segmental Joint(s) Lumbopelvic/Sacral SIJ : L4, L5, L5/S1, and L SIJ Segmental Joint(s) Thoracic : T3, T7, and T10 Segmental Joints Upper/Lower Extremities : R Hip and L Hip  Treatment Techniques Used : Diversified CMT and Manual traction  Ischemic compression  Soft-Tissue manipulation    Soft-tissue mobilization was performed in the following areas:   Cervical paraspinal mm. bilateral, Upper Trapezius bilateral, and Levator Scap. bilateral  Thoracic Paraspinal mm. bilateral  Lumbar Paraspinal mm. bilateral and Quadratus Lumborum bilateral            The patient tolerated today's treatment with little or no additional discomfort and was instructed to contact the office for questions or concerns.

## 2024-04-29 ENCOUNTER — ROUTINE PRENATAL (OUTPATIENT)
Dept: OBSTETRICS AND GYNECOLOGY | Facility: CLINIC | Age: 29
End: 2024-04-29
Payer: COMMERCIAL

## 2024-04-29 ENCOUNTER — HOSPITAL ENCOUNTER (OUTPATIENT)
Dept: RADIOLOGY | Facility: CLINIC | Age: 29
Discharge: HOME | End: 2024-04-29
Payer: COMMERCIAL

## 2024-04-29 VITALS — BODY MASS INDEX: 27.46 KG/M2 | SYSTOLIC BLOOD PRESSURE: 114 MMHG | DIASTOLIC BLOOD PRESSURE: 75 MMHG | WEIGHT: 160 LBS

## 2024-04-29 DIAGNOSIS — Z32.01 PREGNANCY TEST POSITIVE (HHS-HCC): ICD-10-CM

## 2024-04-29 DIAGNOSIS — O44.40 LOW-LYING PLACENTA (HHS-HCC): ICD-10-CM

## 2024-04-29 DIAGNOSIS — Z34.80 SUPERVISION OF OTHER NORMAL PREGNANCY, ANTEPARTUM (HHS-HCC): ICD-10-CM

## 2024-04-29 DIAGNOSIS — Z3A.30 30 WEEKS GESTATION OF PREGNANCY (HHS-HCC): Primary | ICD-10-CM

## 2024-04-29 PROCEDURE — 76816 OB US FOLLOW-UP PER FETUS: CPT | Mod: PREGNANT/PARENTING PROGRAM | Performed by: OBSTETRICS & GYNECOLOGY

## 2024-04-29 PROCEDURE — 76816 OB US FOLLOW-UP PER FETUS: CPT | Mod: HD

## 2024-04-29 PROCEDURE — 76817 TRANSVAGINAL US OBSTETRIC: CPT | Mod: PREGNANT/PARENTING PROGRAM | Performed by: OBSTETRICS & GYNECOLOGY

## 2024-04-29 PROCEDURE — 76817 TRANSVAGINAL US OBSTETRIC: CPT

## 2024-04-29 PROCEDURE — 0501F PRENATAL FLOW SHEET: CPT | Performed by: ADVANCED PRACTICE MIDWIFE

## 2024-04-29 NOTE — PROGRESS NOTES
Assessment/Plan   Problem List Items Addressed This Visit       Supervision of other normal pregnancy, antepartum ([facility])    Overview     Desired provider in labor: [x] CNM  [] Physician  [x] Initial BMI: 24.71  [x] Prenatal Labs: wnl  [x] Rh status: positive  [x] Genetic Screening:  RR cfDNA GIRL  [] Baby ASA  [x] Dating confirmation: US at 9.4 Wks on 23    [x] Anatomy US: views initially incomplete--> anatomy wnl though placenta posterior, low-lying, plan for FU at 30 wks  [] Patient added to BirthTracks  [] 1hr GCT at 24-28wks:  [] 3 hr GTT (if indicated):  [] Rhogam (if indicated):   [] Fetal Surveillance (if indicated):    [] Tdap (27-36wks):  [] RSV (32-36wks)  [] Flu Shot:  [] COVID vaccine:     [] Breastfeeding  [] Pain management during labor:   [] Postpartum Birth control method:     [] GBS at 36 wks:  [] 39 weeks discussion of IOL vs. Expectant management:  [] Mode of delivery:  pending follow-up on low-lying placenta         Low-lying placenta ([facility])    Overview     Posterior, 1.2 cm from internal os  Plan for follow-up at 30 weeks            Postpartum contraception options discussed, was on OCPs in the past but feels she feels much better off of contraception. Is leaning toward declining pharmacologic options at this point.   Growth US today, normal per patient. Report not yet finalized.   Reviewed s/sx of PTL, warning signs, fetal movement counts, and when to call provider  Follow up in 2 week for a routine prenatal visit.    JERSEY Milton-MATTHIAS Cole     Radha Epstein is a 29 y.o.  at 30w0d with a working estimated date of delivery of 2024, by Ultrasound who presents for a routine prenatal visit.     She denies vaginal bleeding, leakage of fluid, decreased fetal movements. Had some bhx ctxs over the weekend along with pelvic pressure, was considering coming into triage but eventually felt well enough to go out to eat and pain has since resolved.      Objective   Physical Exam:   Weight: 72.6 kg (160 lb)  TW.258 kg (16 lb)  Expected Total Weight Gain: 11.5 kg (25 lb)-16 kg (35 lb)   Pregravid BMI: 24.71  BP: 114/75                  Postpartum Depression: Low Risk  (2024)    [place]  Depression Scale     Last EPDS Total Score: 4     Last EPDS Self Harm Result: Never        Prenatal Labs  Lab Results   Component Value Date    HGB 11.7 (L) 04/10/2024    HCT 34.1 (L) 04/10/2024     04/10/2024    ABO O 2023    LABRH POS 2023    NEISSGONOAMP Negative 2023    CHLAMTRACAMP Negative 2023    SYPHT Nonreactive 04/10/2024    HEPBSAG Nonreactive 2023    HIV1X2 Nonreactive 2023    URINECULTURE No growth 2023     Lab Results   Component Value Date    GLUC1P 124 04/10/2024

## 2024-05-07 ENCOUNTER — ALLIED HEALTH (OUTPATIENT)
Dept: INTEGRATIVE MEDICINE | Facility: CLINIC | Age: 29
End: 2024-05-07
Payer: COMMERCIAL

## 2024-05-07 ENCOUNTER — APPOINTMENT (OUTPATIENT)
Dept: DERMATOLOGY | Facility: CLINIC | Age: 29
End: 2024-05-07
Payer: COMMERCIAL

## 2024-05-07 DIAGNOSIS — M79.9 POSTURAL STRAIN: ICD-10-CM

## 2024-05-07 DIAGNOSIS — M99.01 SEGMENTAL AND SOMATIC DYSFUNCTION OF CERVICAL REGION: ICD-10-CM

## 2024-05-07 DIAGNOSIS — O99.891 BACK PAIN IN PREGNANCY (HHS-HCC): ICD-10-CM

## 2024-05-07 DIAGNOSIS — M54.9 BACK PAIN IN PREGNANCY (HHS-HCC): ICD-10-CM

## 2024-05-07 DIAGNOSIS — M99.05 SEGMENTAL AND SOMATIC DYSFUNCTION OF PELVIC REGION: ICD-10-CM

## 2024-05-07 DIAGNOSIS — M99.02 SEGMENTAL AND SOMATIC DYSFUNCTION OF THORACIC REGION: ICD-10-CM

## 2024-05-07 DIAGNOSIS — M99.03 SEGMENTAL AND SOMATIC DYSFUNCTION OF LUMBAR REGION: Primary | ICD-10-CM

## 2024-05-07 DIAGNOSIS — M79.10 MYALGIA: ICD-10-CM

## 2024-05-07 PROCEDURE — 98941 CHIROPRACT MANJ 3-4 REGIONS: CPT | Performed by: CHIROPRACTOR

## 2024-05-07 NOTE — PROGRESS NOTES
Subjective   Patient ID: Radha Epstein is a 29 y.o. female who presents May 7, 2024 for low back pain.    (5/25) VPCY  JAZMYN: 7/8/24    Today, the patient rates their degree of pain as a 4 out of 10 on the numeric pain rating scale.     Radha returns for continued treatment of low back pain. Patient states that she is doing well today. She states that she has occasional low back discomfort, R>L. She states that she mostly has discomfort if walking or sitting for an extended period of time. She states that she has had very little neck symptoms since last visit. Denies any associated symptoms or change in medical history since last visit and will follow up in 2 weeks.            Objective   Physical Exam  Constitutional:       General: She is not in acute distress.     Appearance: Normal appearance.       HENT:      Head: Normocephalic and atraumatic.   Neurological:      General: No focal deficit present.      Mental Status: She is alert and oriented to person, place, and time.      Cranial Nerves: No dysarthria or facial asymmetry.      Sensory: Sensation is intact.      Motor: Motor function is intact.      Coordination: Coordination is intact.      Gait: Gait is intact.   Psychiatric:         Attention and Perception: Attention normal.         Mood and Affect: Mood normal.         Speech: Speech normal.         Behavior: Behavior is cooperative.       Palpation of the following region(s) revealed:  Cervical: Upper trapezius bilateral, hypertonicity and tenderness.  Levator scapulae bilateral, hypertonicity and tenderness.  Cervical paraspinals bilateral, hypertonicity and tenderness.  Thoracic: Thoracic paraspinals bilateral, hypertonicity and tenderness.  Lumbar: Lumbar paraspinals bilateral, hypertonicity and tenderness.  Quadratus lumborum bilateral, hypertonicity and tenderness.        Segmental Joint(s): Segmental joint dysfunction was assessed with motion palpation and is identified in the following  areas:  Cervical : C5 C6  Thoracic : T3, T7, and T10  Lumbopelvic / Sacral SIJ : L4, L5, L5/S1, and L SIJ  Upper / Lower Extremities : R Hip and L Hip      Assessment/Plan   Today's Treatment Included: Chiropractic manipulation to the Segmental Joint(s) Cervical : C5 C6 Segmental Joint(s) Lumbopelvic/Sacral SIJ : L4, L5, L5/S1, and L SIJ Segmental Joint(s) Thoracic : T3, T7, and T10 Segmental Joints Upper/Lower Extremities : R Hip and L Hip  Treatment Techniques Used : Diversified CMT and Manual traction  Ischemic compression  Soft-Tissue manipulation    Soft-tissue mobilization was performed in the following areas:   Cervical paraspinal mm. bilateral, Upper Trapezius bilateral, and Levator Scap. bilateral  Thoracic Paraspinal mm. bilateral  Lumbar Paraspinal mm. bilateral and Quadratus Lumborum bilateral            The patient tolerated today's treatment with little or no additional discomfort and was instructed to contact the office for questions or concerns.

## 2024-05-13 ENCOUNTER — ROUTINE PRENATAL (OUTPATIENT)
Dept: OBSTETRICS AND GYNECOLOGY | Facility: CLINIC | Age: 29
End: 2024-05-13
Payer: COMMERCIAL

## 2024-05-13 VITALS — DIASTOLIC BLOOD PRESSURE: 70 MMHG | BODY MASS INDEX: 27.84 KG/M2 | SYSTOLIC BLOOD PRESSURE: 123 MMHG | WEIGHT: 162.2 LBS

## 2024-05-13 DIAGNOSIS — Z23 NEED FOR TDAP VACCINATION: Primary | ICD-10-CM

## 2024-05-13 DIAGNOSIS — Z34.80 SUPERVISION OF OTHER NORMAL PREGNANCY, ANTEPARTUM (HHS-HCC): ICD-10-CM

## 2024-05-13 DIAGNOSIS — O44.40 LOW-LYING PLACENTA (HHS-HCC): ICD-10-CM

## 2024-05-13 PROCEDURE — 90715 TDAP VACCINE 7 YRS/> IM: CPT | Performed by: ADVANCED PRACTICE MIDWIFE

## 2024-05-13 PROCEDURE — 0501F PRENATAL FLOW SHEET: CPT | Performed by: ADVANCED PRACTICE MIDWIFE

## 2024-05-13 NOTE — PROGRESS NOTES
SUBJECTIVE  HPI: Radha Epstein is a 29 y.o.  at 32w0d here for RPNV. Denies contractions, bleeding, or LOF. Reports normal fetal movement. Patient reports seasonal allergies, taking zyertec and mucinex.    OBJECTIVE  Visit Vitals  /70   Wt 73.6 kg (162 lb 3.2 oz)   LMP 10/03/2023 (Within Days)   BMI 27.84 kg/m²   OB Status Pregnant   Smoking Status Never   BSA 1.82 m²        ASSESSMENT & PLAN  Radha Epstein is a 29 y.o.  at 32w0d. Problem list updated and edits to plan as below:    -IUP at 32.0 wks    Problem List Items Addressed This Visit       Supervision of other normal pregnancy, antepartum ([facility])    Overview     Desired provider in labor: [x] CNM  [] Physician  [x] Initial BMI: 24.71  [x] Prenatal Labs: wnl  [x] Rh status: positive  [x] Genetic Screening:  RR cfDNA GIRL  [x] Dating confirmation: US at 9.4 Wks on 23    [x] Anatomy US: views initially incomplete--> anatomy wnl though placenta posterior, low-lying, plan for FU at 30 wks--> RESOLVED    [x] 1hr GCT at 24-28wks:  [x] Tdap (27-36wks): done 24  [x] Breastfeeding  [x] Pain management during labor: considering NCB but open to epidural  [x] Postpartum Birth control method: declines  [] GBS at 36 wks:  [] 39 weeks discussion of IOL vs. Expectant management:  [] Mode of delivery:  pending follow-up on low-lying placenta         Relevant Orders    Tdap vaccine, age 7 years and older  ([employer])    Low-lying placenta ([facility])    Overview     Posterior, 1.2 cm from internal os  Plan for follow-up at 30 weeks--> RESOLVED!          Other Visit Diagnoses       Need for Tdap vaccination    -  Primary    Relevant Orders    Tdap vaccine, age 7 years and older  ([employer])           PLAN:  -TDAP today  -Discussed adding flonase for allergies.  -FMCs, labor/srom/bleeding precautions reviewed.   RTC in 2 weeks    JERSEY Milton-MATTHIAS

## 2024-05-20 ENCOUNTER — OFFICE VISIT (OUTPATIENT)
Dept: DERMATOLOGY | Facility: CLINIC | Age: 29
End: 2024-05-20
Payer: COMMERCIAL

## 2024-05-20 DIAGNOSIS — I78.1 SPIDER ANGIOMA: ICD-10-CM

## 2024-05-20 DIAGNOSIS — D22.9 NEVUS: ICD-10-CM

## 2024-05-20 PROCEDURE — 99202 OFFICE O/P NEW SF 15 MIN: CPT | Performed by: SPECIALIST

## 2024-05-20 NOTE — PROGRESS NOTES
Subjective   HPI: Radha Epstein is a 29 y.o. female is here for evaluation and treatment of check of a mole on my back and a spot on her chest that has come back..     ROS: No other skin or systemic complaints other than what is documented elsewhere in the note.    ALLERGIES: Penicillins    SOCIAL:  reports that she has never smoked. She has never used smokeless tobacco. She reports that she does not currently use alcohol. She reports that she does not use drugs.    Objective     Description: Patient has a photograph showing an inflamed irregularly pigmented bordered nevus on her back.  Patient states her  extracted something from this area.  This may have been a hair.  Patient also has a small whitish scar with underlying blanchable spider angioma right anterior chest.  Patient states this was treated during her first pregnancy but has returned during her second.    Assessment/Plan   1. Spider angioma  Right Breast    2. Nevus (2)  Left Lower Back; Right Lower Back       Plan: Full excision of a darkly pigmented nevus left side of back.  Hyfrecation of recurrent spider angioma with overlying hypopigmented scar.    FOLLOW UP: Patient states she would like to wait until after she delivers her second child.    The patient was encouraged to contact me with any further questions or concerns.  Pierre Randolph MD  5/20/2024

## 2024-05-21 ENCOUNTER — TELEPHONE (OUTPATIENT)
Dept: SCHEDULING | Age: 29
End: 2024-05-21
Payer: COMMERCIAL

## 2024-05-21 ENCOUNTER — HOSPITAL ENCOUNTER (OUTPATIENT)
Facility: HOSPITAL | Age: 29
End: 2024-05-21
Attending: OBSTETRICS & GYNECOLOGY | Admitting: OBSTETRICS & GYNECOLOGY
Payer: COMMERCIAL

## 2024-05-21 ENCOUNTER — HOSPITAL ENCOUNTER (OUTPATIENT)
Facility: HOSPITAL | Age: 29
Discharge: HOME | End: 2024-05-21
Attending: OBSTETRICS & GYNECOLOGY | Admitting: OBSTETRICS & GYNECOLOGY
Payer: COMMERCIAL

## 2024-05-21 VITALS
BODY MASS INDEX: 27.42 KG/M2 | SYSTOLIC BLOOD PRESSURE: 122 MMHG | WEIGHT: 160.6 LBS | DIASTOLIC BLOOD PRESSURE: 63 MMHG | TEMPERATURE: 98.2 F | HEIGHT: 64 IN | OXYGEN SATURATION: 97 % | RESPIRATION RATE: 14 BRPM | HEART RATE: 95 BPM

## 2024-05-21 PROBLEM — O44.40 LOW-LYING PLACENTA (HHS-HCC): Status: RESOLVED | Noted: 2024-02-27 | Resolved: 2024-05-21

## 2024-05-21 PROCEDURE — 59025 FETAL NON-STRESS TEST: CPT | Performed by: OBSTETRICS & GYNECOLOGY

## 2024-05-21 PROCEDURE — 99213 OFFICE O/P EST LOW 20 MIN: CPT

## 2024-05-21 PROCEDURE — 99213 OFFICE O/P EST LOW 20 MIN: CPT | Performed by: OBSTETRICS & GYNECOLOGY

## 2024-05-21 RX ORDER — ONDANSETRON HYDROCHLORIDE 2 MG/ML
4 INJECTION, SOLUTION INTRAVENOUS EVERY 6 HOURS PRN
Status: DISCONTINUED | OUTPATIENT
Start: 2024-05-21 | End: 2024-05-21 | Stop reason: HOSPADM

## 2024-05-21 RX ORDER — LABETALOL HYDROCHLORIDE 5 MG/ML
20 INJECTION, SOLUTION INTRAVENOUS ONCE AS NEEDED
Status: DISCONTINUED | OUTPATIENT
Start: 2024-05-21 | End: 2024-05-21 | Stop reason: HOSPADM

## 2024-05-21 RX ORDER — HYDRALAZINE HYDROCHLORIDE 20 MG/ML
5 INJECTION INTRAMUSCULAR; INTRAVENOUS ONCE AS NEEDED
Status: DISCONTINUED | OUTPATIENT
Start: 2024-05-21 | End: 2024-05-21 | Stop reason: HOSPADM

## 2024-05-21 RX ORDER — NIFEDIPINE 10 MG/1
10 CAPSULE ORAL ONCE AS NEEDED
Status: DISCONTINUED | OUTPATIENT
Start: 2024-05-21 | End: 2024-05-21 | Stop reason: HOSPADM

## 2024-05-21 RX ORDER — LIDOCAINE HYDROCHLORIDE 10 MG/ML
0.5 INJECTION INFILTRATION; PERINEURAL ONCE AS NEEDED
Status: DISCONTINUED | OUTPATIENT
Start: 2024-05-21 | End: 2024-05-21 | Stop reason: HOSPADM

## 2024-05-21 RX ORDER — ONDANSETRON 4 MG/1
4 TABLET, FILM COATED ORAL EVERY 6 HOURS PRN
Status: DISCONTINUED | OUTPATIENT
Start: 2024-05-21 | End: 2024-05-21 | Stop reason: HOSPADM

## 2024-05-21 ASSESSMENT — PAIN SCALES - GENERAL: PAINLEVEL_OUTOF10: 7

## 2024-05-21 NOTE — TELEPHONE ENCOUNTER
Patient stated she is 33 week and having some cramping with diarrhea no bleeding. Patient wants to know what recomdations. She can be reached at [phone]

## 2024-05-21 NOTE — TELEPHONE ENCOUNTER
Called patient, has been having nausea, low back pain, diarrhea and contractions on and off since the weekend.  Patient denies fevers or chills. Patient states baby is moving well. Denies bleeding or leaking of fluid.  Has been able to stay hydrated.  Discussed with patient that since pain and symptoms are not improving to go to L & D triage for evaluation.

## 2024-05-21 NOTE — H&P
Obstetrical Triage History and Physical     Radha Epstein is a 29 y.o.  at 33w1d    Chief Complaint: Nausea/Vomiting In Pregnancy and Contractions (Cramping from back around to lower abdomen, nausea,  (no vomitting), diarrhea, fatigue, light-headed, dizzy)    Assessment/Plan    28yo  at 33w1d with likely gastroenteritis  -Declines nausea medication, UA negative for ketones, last episode of diarrhea overnight  -Discussed supportive measures, safe OTC medications, encouraged hydration     contractions  -Cervix closed/thick, no regular contractions  -Discussed staying for 2 hour recheck, patient declines  -Reviewed  labor precautions  -Keep scheduled JONI visit      Pregnancy Problems (from 23 to present)       Problem Noted Resolved    Supervision of other normal pregnancy, antepartum ([facility]) 2024 by AMELIA Milton No    Priority:  Medium      Overview Addendum 2024  2:26 PM by Efraín Nguyen MD     Desired provider in labor: [x] CNM  [] Physician  [x] Initial BMI: 24.71  [x] Prenatal Labs: wnl  [x] Rh status: positive  [x] Genetic Screening:  RR cfDNA GIRL  [x] Dating confirmation: US at 9.4 Wks on 23    [x] Anatomy US: views initially incomplete--> anatomy wnl though placenta posterior, low-lying, plan for FU at 30 wks--> RESOLVED    [x] 1hr GCT at 24-28wks:  [x] Tdap (27-36wks): done 24  [x] Breastfeeding  [x] Pain management during labor: considering NCB but open to epidural  [x] Postpartum Birth control method: declines  [] GBS at 36 wks:  [] 39 weeks discussion of IOL vs. Expectant management:  [] Mode of delivery:           Low-lying placenta ([facility]) 2024 by AMELIA Milton 2024 by Efraín Nguyen MD    Priority:  Medium      Overview Addendum 2024  9:51 AM by AMELIA Milton     Posterior, 1.2 cm from internal os  Plan for follow-up at 30 weeks--> RESOLVED!               Subjective    Radha is a 30yo  at 33w1d presents with nausea and diarrhea since .  No vomiting.  Last episode of diarrhea overnight.  She also reports some constant low back pain that occasionally wraps around to her lower abdomen and is painful.  She can't quantify how often this is happening as it is not regular.  No fever, chills, no sick contacts.  No vaginal bleeding, no loss of fluid.     Obstetrical History   OB History    Para Term  AB Living   2 1 1     1   SAB IAB Ectopic Multiple Live Births           1      # Outcome Date GA Lbr Vasu/2nd Weight Sex Delivery Anes PTL Lv   2 Current            1 Term 23    F Vag-Spont EPI N JASPAL       Past Medical History  Past Medical History:   Diagnosis Date    Encounter for immunization     COVID-19 vaccine administered    Low-lying placenta ([facility]) 2024    Posterior, 1.2 cm from internal os  Plan for follow-up at 30 weeks--> RESOLVED!    Pain in thoracic spine 10/04/2022    Thoracic spine pain    Segmental and somatic dysfunction of rib cage 10/04/2022    Rib cage dysfunction        Past Surgical History   No past surgical history on file.    Social History  Social History     Tobacco Use    Smoking status: Never    Smokeless tobacco: Never   Substance Use Topics    Alcohol use: Not Currently     Substance and Sexual Activity   Drug Use Never       Allergies  Penicillins     Medications  Medications Prior to Admission   Medication Sig Dispense Refill Last Dose    magnesium 30 mg tablet Take 1 tablet (30 mg) by mouth 2 times a day. Pt is unsure about dose amount       prenatal vitamin calcium-iron-folic 27 mg iron- 1 mg tablet Take 1 tablet by mouth once daily.          Objective    Last Vitals  Temp Pulse Resp BP MAP O2 Sat   36.8 °C (98.2 °F) 95 14 122/63   97 %     Physical Examination  Gen:  Alert, oriented, well-nourished, NAD  Pulm:  Normal respiratory effort  Abd:  Gravid, soft, nontender  Obstetrics  FHTs:  140s, moderate  variability, +accels, no decels  TOCO:  None  Cervix: closed/thick  Ext:  Trace edema, no calf tenderness  Neuro:  Grossly intact    Non-Stress Test   Baseline Fetal Heart Rate for Non-Stress Test: 140 BPM  Variability in Waveform for Non-Stress Test: Moderate  Accelerations in Non-Stress Test: Yes, greater than/equal to 15 bpm, lasting at least 15 seconds  Decelerations in Non-Stress Test: None  Contractions in Non-Stress Test: Not present  Interpretation of Non-Stress Test   Interpretation of Non-Stress Test: Reactive

## 2024-05-22 ENCOUNTER — ALLIED HEALTH (OUTPATIENT)
Dept: INTEGRATIVE MEDICINE | Facility: CLINIC | Age: 29
End: 2024-05-22
Payer: COMMERCIAL

## 2024-05-22 DIAGNOSIS — M99.01 SEGMENTAL AND SOMATIC DYSFUNCTION OF CERVICAL REGION: ICD-10-CM

## 2024-05-22 DIAGNOSIS — M99.05 SEGMENTAL AND SOMATIC DYSFUNCTION OF PELVIC REGION: ICD-10-CM

## 2024-05-22 DIAGNOSIS — M79.10 MYALGIA: ICD-10-CM

## 2024-05-22 DIAGNOSIS — M54.9 BACK PAIN IN PREGNANCY (HHS-HCC): ICD-10-CM

## 2024-05-22 DIAGNOSIS — M99.03 SEGMENTAL AND SOMATIC DYSFUNCTION OF LUMBAR REGION: Primary | ICD-10-CM

## 2024-05-22 DIAGNOSIS — O99.891 BACK PAIN IN PREGNANCY (HHS-HCC): ICD-10-CM

## 2024-05-22 DIAGNOSIS — M79.9 POSTURAL STRAIN: ICD-10-CM

## 2024-05-22 DIAGNOSIS — M99.02 SEGMENTAL AND SOMATIC DYSFUNCTION OF THORACIC REGION: ICD-10-CM

## 2024-05-22 PROCEDURE — 98941 CHIROPRACT MANJ 3-4 REGIONS: CPT | Performed by: CHIROPRACTOR

## 2024-05-22 NOTE — PROGRESS NOTES
Subjective   Patient ID: Radha Epstein is a 29 y.o. female who presents May 22, 2024 for low back pain.    (6/25) VPCY  JAZMYN: 7/8/24    Today, the patient rates their degree of pain as a 4 out of 10 on the numeric pain rating scale.     Radha returns for continued treatment of low back pain. Patient states that she has had increased low back pain for the past week. She states that pain will go across the low back but is worse on the right. She states that symptoms are not different that what she first presented with. She states that her neck and upper back has been less stiff. Denies any associated symptoms or change in medical history since last visit and will follow up in 1 week.            Objective   Physical Exam  Constitutional:       General: She is not in acute distress.     Appearance: Normal appearance.       HENT:      Head: Normocephalic and atraumatic.   Neurological:      General: No focal deficit present.      Mental Status: She is alert and oriented to person, place, and time.      Cranial Nerves: No dysarthria or facial asymmetry.      Sensory: Sensation is intact.      Motor: Motor function is intact.      Coordination: Coordination is intact.      Gait: Gait is intact.   Psychiatric:         Attention and Perception: Attention normal.         Mood and Affect: Mood normal.         Speech: Speech normal.         Behavior: Behavior is cooperative.       Palpation of the following region(s) revealed:  Cervical: Upper trapezius bilateral, hypertonicity and tenderness.  Levator scapulae bilateral, hypertonicity and tenderness.  Cervical paraspinals bilateral, hypertonicity and tenderness.  Thoracic: Thoracic paraspinals bilateral, hypertonicity and tenderness.  Lumbar: Lumbar paraspinals bilateral, hypertonicity and tenderness.  Quadratus lumborum bilateral, hypertonicity and tenderness.        Segmental Joint(s): Segmental joint dysfunction was assessed with motion palpation and is identified in  the following areas:  Cervical : C5 C6  Thoracic : T3, T7, and T10  Lumbopelvic / Sacral SIJ : L4, L5, L5/S1, and L SIJ  Upper / Lower Extremities : R Hip and L Hip      Assessment/Plan   Today's Treatment Included: Chiropractic manipulation to the Segmental Joint(s) Cervical : C5 C6 Segmental Joint(s) Lumbopelvic/Sacral SIJ : L4, L5, L5/S1, and L SIJ Segmental Joint(s) Thoracic : T3, T7, and T10 Segmental Joints Upper/Lower Extremities : R Hip and L Hip  Treatment Techniques Used : Diversified CMT and Manual traction  Ischemic compression  Soft-Tissue manipulation    Soft-tissue mobilization was performed in the following areas:   Cervical paraspinal mm. bilateral, Upper Trapezius bilateral, and Levator Scap. bilateral  Thoracic Paraspinal mm. bilateral  Lumbar Paraspinal mm. bilateral and Quadratus Lumborum bilateral            The patient tolerated today's treatment with little or no additional discomfort and was instructed to contact the office for questions or concerns.

## 2024-05-29 ENCOUNTER — ALLIED HEALTH (OUTPATIENT)
Dept: INTEGRATIVE MEDICINE | Facility: CLINIC | Age: 29
End: 2024-05-29
Payer: COMMERCIAL

## 2024-05-29 DIAGNOSIS — M99.01 SEGMENTAL AND SOMATIC DYSFUNCTION OF CERVICAL REGION: ICD-10-CM

## 2024-05-29 DIAGNOSIS — M99.03 SEGMENTAL AND SOMATIC DYSFUNCTION OF LUMBAR REGION: Primary | ICD-10-CM

## 2024-05-29 DIAGNOSIS — M99.05 SEGMENTAL AND SOMATIC DYSFUNCTION OF PELVIC REGION: ICD-10-CM

## 2024-05-29 DIAGNOSIS — M79.10 MYALGIA: ICD-10-CM

## 2024-05-29 DIAGNOSIS — M54.9 BACK PAIN IN PREGNANCY (HHS-HCC): ICD-10-CM

## 2024-05-29 DIAGNOSIS — O99.891 BACK PAIN IN PREGNANCY (HHS-HCC): ICD-10-CM

## 2024-05-29 DIAGNOSIS — M79.9 POSTURAL STRAIN: ICD-10-CM

## 2024-05-29 DIAGNOSIS — M99.02 SEGMENTAL AND SOMATIC DYSFUNCTION OF THORACIC REGION: ICD-10-CM

## 2024-05-29 PROCEDURE — 98941 CHIROPRACT MANJ 3-4 REGIONS: CPT | Performed by: CHIROPRACTOR

## 2024-05-29 NOTE — PROGRESS NOTES
Subjective   Patient ID: Radha Epstein is a 29 y.o. female who presents May 29, 2024 for low back pain.    (7/25) VPCY  JAZMYN: 7/8/24    Today, the patient rates their degree of pain as a 4 out of 10 on the numeric pain rating scale.     Radha returns for continued treatment of low back pain. Patient states that low back symptoms are about the same today. She states that she had mild improvement after last week that lasted for a day or so. She states that neck and upper back symptoms are slightly better. Denies any associated symptoms or change in medical history since last visit and will follow up in 1 week.            Objective   Physical Exam  Constitutional:       General: She is not in acute distress.     Appearance: Normal appearance.       HENT:      Head: Normocephalic and atraumatic.   Neurological:      General: No focal deficit present.      Mental Status: She is alert and oriented to person, place, and time.      Cranial Nerves: No dysarthria or facial asymmetry.      Sensory: Sensation is intact.      Motor: Motor function is intact.      Coordination: Coordination is intact.      Gait: Gait is intact.   Psychiatric:         Attention and Perception: Attention normal.         Mood and Affect: Mood normal.         Speech: Speech normal.         Behavior: Behavior is cooperative.         Palpation of the following region(s) revealed:  Cervical: Upper trapezius bilateral, hypertonicity and tenderness.  Levator scapulae bilateral, hypertonicity and tenderness.  Cervical paraspinals bilateral, hypertonicity and tenderness.  Thoracic: Thoracic paraspinals bilateral, hypertonicity and tenderness.  Lumbar: Lumbar paraspinals bilateral, hypertonicity and tenderness.  Quadratus lumborum bilateral, hypertonicity and tenderness.        Segmental Joint(s): Segmental joint dysfunction was assessed with motion palpation and is identified in the following areas:  Cervical : C5 C6  Thoracic : T3, T7, and  T10  Lumbopelvic / Sacral SIJ : L4, L5, L5/S1, and L SIJ  Upper / Lower Extremities : R Hip and L Hip      Assessment/Plan   Today's Treatment Included: Chiropractic manipulation to the Segmental Joint(s) Cervical : C5 C6 Segmental Joint(s) Lumbopelvic/Sacral SIJ : L4, L5, L5/S1, and L SIJ Segmental Joint(s) Thoracic : T3, T7, and T10 Segmental Joints Upper/Lower Extremities : R Hip and L Hip  Treatment Techniques Used : Diversified CMT and Manual traction  Ischemic compression  Soft-Tissue manipulation    Soft-tissue mobilization was performed in the following areas:   Cervical paraspinal mm. bilateral, Upper Trapezius bilateral, and Levator Scap. bilateral  Thoracic Paraspinal mm. bilateral  Lumbar Paraspinal mm. bilateral and Quadratus Lumborum bilateral            The patient tolerated today's treatment with little or no additional discomfort and was instructed to contact the office for questions or concerns.

## 2024-06-03 ENCOUNTER — ROUTINE PRENATAL (OUTPATIENT)
Dept: OBSTETRICS AND GYNECOLOGY | Facility: CLINIC | Age: 29
End: 2024-06-03
Payer: COMMERCIAL

## 2024-06-03 VITALS — SYSTOLIC BLOOD PRESSURE: 114 MMHG | DIASTOLIC BLOOD PRESSURE: 68 MMHG | WEIGHT: 160 LBS | BODY MASS INDEX: 27.46 KG/M2

## 2024-06-03 DIAGNOSIS — Z34.80 SUPERVISION OF OTHER NORMAL PREGNANCY, ANTEPARTUM (HHS-HCC): ICD-10-CM

## 2024-06-03 DIAGNOSIS — Z3A.35 35 WEEKS GESTATION OF PREGNANCY (HHS-HCC): Primary | ICD-10-CM

## 2024-06-03 PROCEDURE — 0501F PRENATAL FLOW SHEET: CPT | Performed by: ADVANCED PRACTICE MIDWIFE

## 2024-06-03 NOTE — PROGRESS NOTES
SUBJECTIVE  HPI: Radha Epstein is a 29 y.o.  at 35w0d here for RPNV. Denies contractions, bleeding, or LOF. Reports normal fetal movement. Patient reports being in L&D triage with lower back cramping that wraps around to her abdomen, accompanied with intermittent diarrhea and nausea. Cervix was c/l/h. She has continued to have some cramping and intermittent diarrhea but no further nausea. Reports there is no pattern to her pain. Denies urinary symptoms. Endorses good FM daily. Denies LOF, vb.     Medical Problems       Problem List       Supervision of other normal pregnancy, antepartum ([facility])    Overview Addendum 2024  2:26 PM by Efraín Nguyen MD     Desired provider in labor: [x] CNM  [] Physician  [x] Initial BMI: 24.71  [x] Prenatal Labs: wnl  [x] Rh status: positive  [x] Genetic Screening:  RR cfDNA GIRL  [x] Dating confirmation: US at 9.4 Wks on 23    [x] Anatomy US: views initially incomplete--> anatomy wnl though placenta posterior, low-lying, plan for FU at 30 wks--> RESOLVED    [x] 1hr GCT at 24-28wks:  [x] Tdap (27-36wks): done 24  [x] Breastfeeding  [x] Pain management during labor: considering NCB but open to epidural  [x] Postpartum Birth control method: declines  [] GBS at 36 wks:  [] 39 weeks discussion of IOL vs. Expectant management:  [] Mode of delivery:                 OBJECTIVE  Visit Vitals  /68   Wt 72.6 kg (160 lb)   LMP 10/03/2023 (Within Days)   BMI 27.46 kg/m²   OB Status Pregnant   Smoking Status Never   BSA 1.81 m²        ASSESSMENT & PLAN  Problem list updated and edits to plan as below:    No problem-specific Assessment & Plan notes found for this encounter.       No orders of the defined types were placed in this encounter.      RTC in 2 weeks    AMELIA Milton

## 2024-06-03 NOTE — PROGRESS NOTES
SUBJECTIVE  HPI: Radha Epstein is a 29 y.o.  at 35w0d here for RPNV. Denies contractions, bleeding, or LOF. Reports normal fetal movement. Patient reports was in L&D triage 2 weeks ago for LBP and cramping with intermittent diarrhea and nausea. Has continued to have cramping and intermittent diarrhea but nothing consistent. Nausea has resolved. Denies LOF, VB. Good FM. Cervix on exam was c/l/h.     Interested in 39 week IOL.     Medical Problems       Problem List       Supervision of other normal pregnancy, antepartum ([facility])    Overview Addendum 2024  2:26 PM by Efraín Nguyen MD     Desired provider in labor: [x] CNM  [] Physician  [x] Initial BMI: 24.71  [x] Prenatal Labs: wnl  [x] Rh status: positive  [x] Genetic Screening:  RR cfDNA GIRL  [x] Dating confirmation: US at 9.4 Wks on 23    [x] Anatomy US: views initially incomplete--> anatomy wnl though placenta posterior, low-lying, plan for FU at 30 wks--> RESOLVED    [x] 1hr GCT at 24-28wks:  [x] Tdap (27-36wks): done 24  [x] Breastfeeding  [x] Pain management during labor: considering NCB but open to epidural  [x] Postpartum Birth control method: declines  [] GBS at 36 wks:  [] 39 weeks discussion of IOL vs. Expectant management:  [] Mode of delivery:                 OBJECTIVE  Visit Vitals  /68   Wt 72.6 kg (160 lb)   LMP 10/03/2023 (Within Days)   BMI 27.46 kg/m²   OB Status Pregnant   Smoking Status Never   BSA 1.81 m²        ASSESSMENT & PLAN  Problem list updated and edits to plan as below:    IUP at 35.0 wks    Return precautions reviewed.   Growth US next week.   GBS next week.  Will schedule 39 wk IOL.     RTC in 1 weeks    AMELIA Milton

## 2024-06-04 ENCOUNTER — ALLIED HEALTH (OUTPATIENT)
Dept: INTEGRATIVE MEDICINE | Facility: CLINIC | Age: 29
End: 2024-06-04
Payer: COMMERCIAL

## 2024-06-04 DIAGNOSIS — M79.10 MYALGIA: ICD-10-CM

## 2024-06-04 DIAGNOSIS — O99.891 BACK PAIN IN PREGNANCY (HHS-HCC): ICD-10-CM

## 2024-06-04 DIAGNOSIS — M99.05 SEGMENTAL AND SOMATIC DYSFUNCTION OF PELVIC REGION: ICD-10-CM

## 2024-06-04 DIAGNOSIS — M79.9 POSTURAL STRAIN: ICD-10-CM

## 2024-06-04 DIAGNOSIS — M54.9 BACK PAIN IN PREGNANCY (HHS-HCC): ICD-10-CM

## 2024-06-04 DIAGNOSIS — M99.03 SEGMENTAL AND SOMATIC DYSFUNCTION OF LUMBAR REGION: Primary | ICD-10-CM

## 2024-06-04 DIAGNOSIS — M99.02 SEGMENTAL AND SOMATIC DYSFUNCTION OF THORACIC REGION: ICD-10-CM

## 2024-06-04 DIAGNOSIS — M99.01 SEGMENTAL AND SOMATIC DYSFUNCTION OF CERVICAL REGION: ICD-10-CM

## 2024-06-04 PROCEDURE — 98941 CHIROPRACT MANJ 3-4 REGIONS: CPT | Performed by: CHIROPRACTOR

## 2024-06-04 PROCEDURE — 97112 NEUROMUSCULAR REEDUCATION: CPT | Performed by: CHIROPRACTOR

## 2024-06-10 ENCOUNTER — ROUTINE PRENATAL (OUTPATIENT)
Dept: OBSTETRICS AND GYNECOLOGY | Facility: CLINIC | Age: 29
End: 2024-06-10
Payer: COMMERCIAL

## 2024-06-10 ENCOUNTER — HOSPITAL ENCOUNTER (OUTPATIENT)
Dept: RADIOLOGY | Facility: CLINIC | Age: 29
Discharge: HOME | End: 2024-06-10
Payer: COMMERCIAL

## 2024-06-10 VITALS — SYSTOLIC BLOOD PRESSURE: 105 MMHG | WEIGHT: 159 LBS | BODY MASS INDEX: 27.29 KG/M2 | DIASTOLIC BLOOD PRESSURE: 71 MMHG

## 2024-06-10 DIAGNOSIS — Z34.80 SUPERVISION OF OTHER NORMAL PREGNANCY, ANTEPARTUM (HHS-HCC): ICD-10-CM

## 2024-06-10 DIAGNOSIS — Z34.80 SUPERVISION OF OTHER NORMAL PREGNANCY, ANTEPARTUM (HHS-HCC): Primary | ICD-10-CM

## 2024-06-10 DIAGNOSIS — Z34.83 ENCOUNTER FOR SUPERVISION OF OTHER NORMAL PREGNANCY IN THIRD TRIMESTER (HHS-HCC): ICD-10-CM

## 2024-06-10 DIAGNOSIS — Z3A.36 36 WEEKS GESTATION OF PREGNANCY (HHS-HCC): Primary | ICD-10-CM

## 2024-06-10 DIAGNOSIS — Z34.90 PREGNANT (HHS-HCC): ICD-10-CM

## 2024-06-10 PROCEDURE — 76816 OB US FOLLOW-UP PER FETUS: CPT

## 2024-06-10 PROCEDURE — 76816 OB US FOLLOW-UP PER FETUS: CPT | Performed by: MEDICAL GENETICS

## 2024-06-10 PROCEDURE — 0501F PRENATAL FLOW SHEET: CPT | Performed by: ADVANCED PRACTICE MIDWIFE

## 2024-06-10 PROCEDURE — 87081 CULTURE SCREEN ONLY: CPT | Performed by: ADVANCED PRACTICE MIDWIFE

## 2024-06-10 NOTE — PROGRESS NOTES
Subjective   Patient ID [id number]   Radha Epstein is a 29 y.o.  at 36w0d with a working estimated date of delivery of 2024, by Ultrasound who presents for a routine prenatal visit. She denies vaginal bleeding, leakage of fluid, decreased fetal movements, or contractions.    Her pregnancy is complicated by:  -low-lying placenta --> RESOLVED on repeat imaging    Objective   Physical Exam:   Weight: 72.1 kg (159 lb)Well-appearing, no acute distress  Respiratory effort normal  See flowsheet for OB exam     Expected Total Weight Gain: 11.5 kg (25 lb)-16 kg (35 lb)   Pregravid BMI: 24.71    Prenatal Labs    Lab Results   Component Value Date    HGB 11.7 (L) 04/10/2024    HCT 34.1 (L) 04/10/2024    ABO O 2023    HEPBSAG Nonreactive 2023       Assessment/Plan   Diagnoses and all orders for this visit:  36 weeks gestation of pregnancy ([facility])  -     Group B Streptococcus (GBS) Prenatal Screen, Culture  Encounter for supervision of other normal pregnancy in third trimester ([facility])  -     Group B Streptococcus (GBS) Prenatal Screen, Culture  Supervision of other normal pregnancy, antepartum ([facility])    Continue prenatal vitamin.  Labs reviewed.  GBS taken.  Expected mode of delivery vaginal. RN to schedule 39.0 wk IOL today.  Follow up in 1 week for a routine prenatal visit. FMCs, labor/srom/bleeding precautions reviewed.

## 2024-06-12 ENCOUNTER — APPOINTMENT (OUTPATIENT)
Dept: INTEGRATIVE MEDICINE | Facility: CLINIC | Age: 29
End: 2024-06-12
Payer: COMMERCIAL

## 2024-06-12 DIAGNOSIS — M79.9 POSTURAL STRAIN: ICD-10-CM

## 2024-06-12 DIAGNOSIS — O99.891 BACK PAIN IN PREGNANCY (HHS-HCC): ICD-10-CM

## 2024-06-12 DIAGNOSIS — M99.05 SEGMENTAL AND SOMATIC DYSFUNCTION OF PELVIC REGION: ICD-10-CM

## 2024-06-12 DIAGNOSIS — M99.01 SEGMENTAL AND SOMATIC DYSFUNCTION OF CERVICAL REGION: ICD-10-CM

## 2024-06-12 DIAGNOSIS — M79.10 MYALGIA: ICD-10-CM

## 2024-06-12 DIAGNOSIS — M99.03 SEGMENTAL AND SOMATIC DYSFUNCTION OF LUMBAR REGION: Primary | ICD-10-CM

## 2024-06-12 DIAGNOSIS — M54.9 BACK PAIN IN PREGNANCY (HHS-HCC): ICD-10-CM

## 2024-06-12 DIAGNOSIS — M99.02 SEGMENTAL AND SOMATIC DYSFUNCTION OF THORACIC REGION: ICD-10-CM

## 2024-06-12 PROCEDURE — 97112 NEUROMUSCULAR REEDUCATION: CPT | Performed by: CHIROPRACTOR

## 2024-06-12 PROCEDURE — 98941 CHIROPRACT MANJ 3-4 REGIONS: CPT | Performed by: CHIROPRACTOR

## 2024-06-12 NOTE — PROGRESS NOTES
Subjective   Patient ID: Radha Epstein is a 29 y.o. female who presents June 12, 2024 for low back pain.    (9/25) VPCY  JAZMYN: 7/8/24    Today, the patient rates their degree of pain as a 4 out of 10 on the numeric pain rating scale.     Radha returns for continued treatment of low back pain. Patient states that low back symptoms are slightly better today. She states that pain is slightly reduced but she has started to have numbness in her right buttock. She states that neck/upper back symptoms are about the same today. She states that they always improve after treatment, but return slowly over the week.  Denies any associated symptoms or change in medical history since last visit and will follow up in 1 week.            Objective   Physical Exam  Constitutional:       General: She is not in acute distress.     Appearance: Normal appearance.       HENT:      Head: Normocephalic and atraumatic.   Neurological:      General: No focal deficit present.      Mental Status: She is alert and oriented to person, place, and time.      Cranial Nerves: No dysarthria or facial asymmetry.      Sensory: Sensation is intact.      Motor: Motor function is intact.      Coordination: Coordination is intact.      Gait: Gait is intact.   Psychiatric:         Attention and Perception: Attention normal.         Mood and Affect: Mood normal.         Speech: Speech normal.         Behavior: Behavior is cooperative.       Palpation of the following region(s) revealed:  Cervical: Upper trapezius bilateral, hypertonicity and tenderness.  Levator scapulae bilateral, hypertonicity and tenderness.  Cervical paraspinals bilateral, hypertonicity and tenderness.  Thoracic: Thoracic paraspinals bilateral, hypertonicity and tenderness.  Lumbar: Lumbar paraspinals bilateral, hypertonicity and tenderness.  Quadratus lumborum bilateral, hypertonicity and tenderness.        Segmental Joint(s): Segmental joint dysfunction was assessed with motion  palpation and is identified in the following areas:  Cervical : C5 C6  Thoracic : T3, T7, and T10  Lumbopelvic / Sacral SIJ : L4, L5, L5/S1, and L SIJ  Upper / Lower Extremities : R Hip and L Hip      Assessment/Plan   Today's Treatment Included: Chiropractic manipulation to the Segmental Joint(s) Cervical : C5 C6 Segmental Joint(s) Lumbopelvic/Sacral SIJ : L4, L5, L5/S1, and L SIJ Segmental Joint(s) Thoracic : T3, T7, and T10 Segmental Joints Upper/Lower Extremities : R Hip and L Hip  Treatment Techniques Used : Diversified CMT and Manual traction  Neuromuscular Re-Education (88771): Start time: 4:25 pm End time: 4:35 pm  1 Units  Pin and stretch  Ischemic compression  Soft-Tissue manipulation    Soft-tissue mobilization was performed in the following areas:   Cervical paraspinal mm. bilateral, Upper Trapezius bilateral, and Levator Scap. bilateral  Thoracic Paraspinal mm. bilateral  Lumbar Paraspinal mm. bilateral and Quadratus Lumborum bilateral    A belly pillow was used without issue.        The patient tolerated today's treatment with little or no additional discomfort and was instructed to contact the office for questions or concerns.

## 2024-06-13 LAB — GP B STREP GENITAL QL CULT: NORMAL

## 2024-06-17 ENCOUNTER — ROUTINE PRENATAL (OUTPATIENT)
Dept: OBSTETRICS AND GYNECOLOGY | Facility: CLINIC | Age: 29
End: 2024-06-17
Payer: COMMERCIAL

## 2024-06-17 VITALS — DIASTOLIC BLOOD PRESSURE: 78 MMHG | BODY MASS INDEX: 27.46 KG/M2 | WEIGHT: 160 LBS | SYSTOLIC BLOOD PRESSURE: 116 MMHG

## 2024-06-17 DIAGNOSIS — Z34.80 SUPERVISION OF OTHER NORMAL PREGNANCY, ANTEPARTUM (HHS-HCC): ICD-10-CM

## 2024-06-17 PROCEDURE — 0501F PRENATAL FLOW SHEET: CPT | Performed by: ADVANCED PRACTICE MIDWIFE

## 2024-06-17 NOTE — PROGRESS NOTES
Subjective   Patient ID [id number]   Radha Epstein is a 29 y.o.  at 37w0d with a working estimated date of delivery of 2024, by Ultrasound who presents for a routine prenatal visit. She denies vaginal bleeding, leakage of fluid, decreased fetal movements. Endorses some bhx ctxs, nothing painful or consistent. Requesting membrane sweep today.    Her pregnancy is complicated by:  -low-lying placenta, RESOLVED    Objective   Physical Exam:   Weight: 72.6 kg (160 lb)  Expected Total Weight Gain: 11.5 kg (25 lb)-16 kg (35 lb)   Pregravid BMI: 24.71    Prenatal Labs    Lab Results   Component Value Date    HGB 11.7 (L) 04/10/2024    HCT 34.1 (L) 04/10/2024    ABO O 2023    HEPBSAG Nonreactive 2023        Assessment/Plan     Continue prenatal vitamin.  Labs reviewed.  GBS negative.  Expected mode of delivery vaginal. IOL scheduled for 39.0 wks at 0800.  Follow up in 1 week for a routine prenatal visit.    AMELIA Tracey

## 2024-06-19 ENCOUNTER — APPOINTMENT (OUTPATIENT)
Dept: INTEGRATIVE MEDICINE | Facility: CLINIC | Age: 29
End: 2024-06-19
Payer: COMMERCIAL

## 2024-06-19 DIAGNOSIS — M99.05 SEGMENTAL AND SOMATIC DYSFUNCTION OF PELVIC REGION: ICD-10-CM

## 2024-06-19 DIAGNOSIS — M99.03 SEGMENTAL AND SOMATIC DYSFUNCTION OF LUMBAR REGION: Primary | ICD-10-CM

## 2024-06-19 DIAGNOSIS — M79.10 MYALGIA: ICD-10-CM

## 2024-06-19 DIAGNOSIS — M79.9 POSTURAL STRAIN: ICD-10-CM

## 2024-06-19 DIAGNOSIS — M99.02 SEGMENTAL AND SOMATIC DYSFUNCTION OF THORACIC REGION: ICD-10-CM

## 2024-06-19 DIAGNOSIS — O99.891 BACK PAIN IN PREGNANCY (HHS-HCC): ICD-10-CM

## 2024-06-19 DIAGNOSIS — M54.9 BACK PAIN IN PREGNANCY (HHS-HCC): ICD-10-CM

## 2024-06-19 DIAGNOSIS — M99.01 SEGMENTAL AND SOMATIC DYSFUNCTION OF CERVICAL REGION: ICD-10-CM

## 2024-06-19 PROCEDURE — 98941 CHIROPRACT MANJ 3-4 REGIONS: CPT | Performed by: CHIROPRACTOR

## 2024-06-19 PROCEDURE — 97112 NEUROMUSCULAR REEDUCATION: CPT | Performed by: CHIROPRACTOR

## 2024-06-19 NOTE — PROGRESS NOTES
Subjective   Patient ID: Radha Epstein is a 29 y.o. female who presents June 19, 2024 for low back pain.    (10/25) VPCY  JAZMYN: 7/8/24    Today, the patient rates their degree of pain as a 4 out of 10 on the numeric pain rating scale.     Radha returns for continued treatment of low back pain. Patient states that low back symptoms are about the same. She states that she has been having more pelvic cramping over the past couple of days. She states that most of the low back discomfort is centralized around the sacrum. Denies any associated symptoms or change in medical history since last visit and will follow up in 1 week.            Objective   Physical Exam  Constitutional:       General: She is not in acute distress.     Appearance: Normal appearance.       HENT:      Head: Normocephalic and atraumatic.   Neurological:      General: No focal deficit present.      Mental Status: She is alert and oriented to person, place, and time.      Cranial Nerves: No dysarthria or facial asymmetry.      Sensory: Sensation is intact.      Motor: Motor function is intact.      Coordination: Coordination is intact.      Gait: Gait is intact.   Psychiatric:         Attention and Perception: Attention normal.         Mood and Affect: Mood normal.         Speech: Speech normal.         Behavior: Behavior is cooperative.       Palpation of the following region(s) revealed:  Cervical: Upper trapezius bilateral, hypertonicity and tenderness.  Levator scapulae bilateral, hypertonicity and tenderness.  Cervical paraspinals bilateral, hypertonicity and tenderness.  Thoracic: Thoracic paraspinals bilateral, hypertonicity and tenderness.  Lumbar: Lumbar paraspinals bilateral, hypertonicity and tenderness.  Quadratus lumborum bilateral, hypertonicity and tenderness.        Segmental Joint(s): Segmental joint dysfunction was assessed with motion palpation and is identified in the following areas:  Cervical : C5 C6  Thoracic : T3, T7, and  T10  Lumbopelvic / Sacral SIJ : L4, L5, L5/S1, and L SIJ  Upper / Lower Extremities : R Hip and L Hip      Assessment/Plan   Today's Treatment Included: Chiropractic manipulation to the Segmental Joint(s) Cervical : C5 C6 Segmental Joint(s) Lumbopelvic/Sacral SIJ : L4, L5, L5/S1, and L SIJ Segmental Joint(s) Thoracic : T3, T7, and T10 Segmental Joints Upper/Lower Extremities : R Hip and L Hip  Treatment Techniques Used : Diversified CMT and Manual traction  Neuromuscular Re-Education (52450): Start time: 8:05 am End time: 8:15 am  1 Units  Pin and stretch  Ischemic compression  Soft-Tissue manipulation    Soft-tissue mobilization was performed in the following areas:   Cervical paraspinal mm. bilateral, Upper Trapezius bilateral, and Levator Scap. bilateral  Thoracic Paraspinal mm. bilateral  Lumbar Paraspinal mm. bilateral and Quadratus Lumborum bilateral    A belly pillow was used without issue.        The patient tolerated today's treatment with little or no additional discomfort and was instructed to contact the office for questions or concerns.

## 2024-06-21 ENCOUNTER — HOSPITAL ENCOUNTER (OUTPATIENT)
Facility: HOSPITAL | Age: 29
Discharge: HOME | End: 2024-06-21
Attending: OBSTETRICS & GYNECOLOGY | Admitting: OBSTETRICS & GYNECOLOGY
Payer: COMMERCIAL

## 2024-06-21 ENCOUNTER — TELEPHONE (OUTPATIENT)
Dept: OBSTETRICS AND GYNECOLOGY | Facility: CLINIC | Age: 29
End: 2024-06-21
Payer: COMMERCIAL

## 2024-06-21 VITALS
WEIGHT: 160.05 LBS | RESPIRATION RATE: 16 BRPM | DIASTOLIC BLOOD PRESSURE: 71 MMHG | HEART RATE: 87 BPM | BODY MASS INDEX: 27.47 KG/M2 | TEMPERATURE: 98.4 F | OXYGEN SATURATION: 98 % | SYSTOLIC BLOOD PRESSURE: 116 MMHG

## 2024-06-21 PROCEDURE — 59025 FETAL NON-STRESS TEST: CPT | Performed by: ADVANCED PRACTICE MIDWIFE

## 2024-06-21 PROCEDURE — 99213 OFFICE O/P EST LOW 20 MIN: CPT

## 2024-06-21 PROCEDURE — 99233 SBSQ HOSP IP/OBS HIGH 50: CPT | Performed by: ADVANCED PRACTICE MIDWIFE

## 2024-06-21 RX ORDER — LIDOCAINE HYDROCHLORIDE 10 MG/ML
0.5 INJECTION INFILTRATION; PERINEURAL ONCE AS NEEDED
Status: DISCONTINUED | OUTPATIENT
Start: 2024-06-21 | End: 2024-06-21 | Stop reason: HOSPADM

## 2024-06-21 RX ORDER — ONDANSETRON HYDROCHLORIDE 2 MG/ML
4 INJECTION, SOLUTION INTRAVENOUS EVERY 6 HOURS PRN
Status: DISCONTINUED | OUTPATIENT
Start: 2024-06-21 | End: 2024-06-21 | Stop reason: HOSPADM

## 2024-06-21 RX ORDER — ONDANSETRON 4 MG/1
4 TABLET, FILM COATED ORAL EVERY 6 HOURS PRN
Status: DISCONTINUED | OUTPATIENT
Start: 2024-06-21 | End: 2024-06-21 | Stop reason: HOSPADM

## 2024-06-21 RX ORDER — LABETALOL HYDROCHLORIDE 5 MG/ML
20 INJECTION, SOLUTION INTRAVENOUS ONCE AS NEEDED
Status: DISCONTINUED | OUTPATIENT
Start: 2024-06-21 | End: 2024-06-21 | Stop reason: HOSPADM

## 2024-06-21 RX ORDER — HYDRALAZINE HYDROCHLORIDE 20 MG/ML
5 INJECTION INTRAMUSCULAR; INTRAVENOUS ONCE AS NEEDED
Status: DISCONTINUED | OUTPATIENT
Start: 2024-06-21 | End: 2024-06-21 | Stop reason: HOSPADM

## 2024-06-21 RX ORDER — NIFEDIPINE 10 MG/1
10 CAPSULE ORAL ONCE AS NEEDED
Status: DISCONTINUED | OUTPATIENT
Start: 2024-06-21 | End: 2024-06-21 | Stop reason: HOSPADM

## 2024-06-21 RX ORDER — FENTANYL/ROPIVACAINE/NS/PF 2MCG/ML-.2
0-25 PLASTIC BAG, INJECTION (ML) INJECTION CONTINUOUS
Status: CANCELLED | OUTPATIENT
Start: 2024-06-21

## 2024-06-21 NOTE — TELEPHONE ENCOUNTER
Patient called, states she is having increased cramping, states she is having a constant pain as well as pain that comes every 3-4 minutes. Patient states she has lost large amount of mucus plug.  Denies bleeding or leaking of fluid. Patient states she hasn't felt baby move as much this morning. Patient advised to go to L & D triage for evaluation. Triage RN at [facility].

## 2024-06-21 NOTE — PROGRESS NOTES
Department of Obstetrics and Gynecology  Labor and Delivery Triage Note        CHIEF COMPLAINT: Contractions and decreased FM    HISTORY OF PRESENT ILLNESS:  Reports she has been experiencing an increase in cramping and contractions since her cervical exam with membrane sweep at her last visit 4 days ago. Since awakening this morning it has increased in frequency and intensity to every 3-4 minutes lasting about 60 seconds each. Reports loss of mucous plug this morning with scant pink spotting. No LOF. She reports + FM but a decrease in FM since onset of stronger contractions.     The patient is a 29 y.o. at 37w4d  OB History          2    Para   1    Term   1            AB        Living   1         SAB        IAB        Ectopic        Multiple        Live Births   1               Patient presents with a chief complaint as above.      Estimated Due Date: 24    PAST MEDICAL HISTORY:   Past Medical History:   Diagnosis Date    Encounter for immunization     COVID-19 vaccine administered    Low-lying placenta ([facility]) 2024    Posterior, 1.2 cm from internal os  Plan for follow-up at 30 weeks--> RESOLVED!    Pain in thoracic spine 10/04/2022    Thoracic spine pain    Segmental and somatic dysfunction of rib cage 10/04/2022    Rib cage dysfunction       PAST  SURGICAL HISTORY: No past surgical history on file.    SOCIAL HISTORY:     reports that she has never smoked. She has never used smokeless tobacco. She reports that she does not currently use alcohol. She reports that she does not use drugs.    MEDICATIONS:    Prior to Admission medications    Medication Sig Start Date End Date Taking? Authorizing Provider   magnesium 30 mg tablet Take 1 tablet (30 mg) by mouth 2 times a day. Pt is unsure about dose amount    Historical Provider, MD   prenatal vitamin calcium-iron-folic 27 mg iron- 1 mg tablet Take 1 tablet by mouth once daily.    Historical Provider, MD       PRENATAL CARE:    Complicated  by: Low lying placenta: Resolved     REVIEW OF SYSTEMS:    A comprehensive review of systems was negative.    APPEARANCE:      Pain:  yes, reports ctxn discomfort 4/10, denies need for pain intervention at this time       PHYSICAL EXAM:    Vital Signs: VS wnl-reviewed/Respirations normal effort    There were no vitals filed for this visit.    Abdomen/Uterus:  gravid/non-tender    Speculum Exam:   deferred    Fetal heart rate:   FHR BL  135 with moderate variability, + accels. Mild intermittent episodic and periodic VD     Cervix:  3/70/-2    Contraction frequency:  2-3    Membranes:  intact    RESULTS:    NST: Reactive    GENERAL LABS:      No results found for this or any previous visit (from the past 24 hour(s)).    TRIAGE COURSE:  No cervical change in 2 hours. Discussed normal length of latent labor Discussed active labor s/s along when when to return and pt expressed proper verbal understanding. REACTIVE NST with + FM    IMPRESSION: Early latent labor, not in active labor: Will return with increasing intensity of contractions, LOF, or concern over fetal movement. REACTIVE NST. Discussed daily fetal kick counts.       DISPOSITION:  Discharge to Home    Lorraine Evans, APRN-CNM

## 2024-06-24 ENCOUNTER — ROUTINE PRENATAL (OUTPATIENT)
Dept: OBSTETRICS AND GYNECOLOGY | Facility: CLINIC | Age: 29
End: 2024-06-24
Payer: COMMERCIAL

## 2024-06-24 VITALS — DIASTOLIC BLOOD PRESSURE: 73 MMHG | SYSTOLIC BLOOD PRESSURE: 120 MMHG | WEIGHT: 162 LBS | BODY MASS INDEX: 27.81 KG/M2

## 2024-06-24 DIAGNOSIS — Z34.80 SUPERVISION OF OTHER NORMAL PREGNANCY, ANTEPARTUM (HHS-HCC): Primary | ICD-10-CM

## 2024-06-24 PROCEDURE — 0501F PRENATAL FLOW SHEET: CPT | Performed by: ADVANCED PRACTICE MIDWIFE

## 2024-06-24 NOTE — PROGRESS NOTES
"Subjective   Patient ID [id number]   Radha Epstein is a 29 y.o.  at 38w0d with a working estimated date of delivery of 2024, by Ultrasound who presents for a routine prenatal visit. She denies vaginal bleeding, leakage of fluid, decreased fetal movements, or contractions.    Was in triage , cervix 370, unchanged on 2 hour recheck.   Requesting membrane sweep today.    Her pregnancy is complicated by:  -low-lying placenta, now resolved     Objective   Physical Exam:   Weight: 73.5 kg (162 lb)  Expected Total Weight Gain: 11.5 kg (25 lb)-16 kg (35 lb)   Pregravid BMI: 24.71  BP: 120/73                  Prenatal Labs  Urine Dip:  No results found for: \"KETONESU\", \"GLUCOSEUR\", \"LEUKOCYTESUR\"  Lab Results   Component Value Date    HGB 11.7 (L) 04/10/2024    HCT 34.1 (L) 04/10/2024    ABO O 2023    HEPBSAG Nonreactive 2023       Assessment/Plan   Continue prenatal vitamin.  Labs reviewed.  GBS negative.   FMCs, labor/srom/bleeding precautions reviewed.   Expected mode of delivery vaginal, IOL scheduled for next Monday!  Follow up in 1 week for a routine prenatal visit.  "

## 2024-06-25 ENCOUNTER — APPOINTMENT (OUTPATIENT)
Dept: INTEGRATIVE MEDICINE | Facility: CLINIC | Age: 29
End: 2024-06-25
Payer: COMMERCIAL

## 2024-06-25 DIAGNOSIS — M99.01 SEGMENTAL AND SOMATIC DYSFUNCTION OF CERVICAL REGION: ICD-10-CM

## 2024-06-25 DIAGNOSIS — M54.9 BACK PAIN IN PREGNANCY (HHS-HCC): ICD-10-CM

## 2024-06-25 DIAGNOSIS — O99.891 BACK PAIN IN PREGNANCY (HHS-HCC): ICD-10-CM

## 2024-06-25 DIAGNOSIS — M99.03 SEGMENTAL AND SOMATIC DYSFUNCTION OF LUMBAR REGION: Primary | ICD-10-CM

## 2024-06-25 DIAGNOSIS — M79.10 MYALGIA: ICD-10-CM

## 2024-06-25 DIAGNOSIS — M99.05 SEGMENTAL AND SOMATIC DYSFUNCTION OF PELVIC REGION: ICD-10-CM

## 2024-06-25 DIAGNOSIS — M99.02 SEGMENTAL AND SOMATIC DYSFUNCTION OF THORACIC REGION: ICD-10-CM

## 2024-06-25 DIAGNOSIS — M79.9 POSTURAL STRAIN: ICD-10-CM

## 2024-06-25 PROCEDURE — 97112 NEUROMUSCULAR REEDUCATION: CPT | Performed by: CHIROPRACTOR

## 2024-06-25 PROCEDURE — 98941 CHIROPRACT MANJ 3-4 REGIONS: CPT | Performed by: CHIROPRACTOR

## 2024-06-25 NOTE — PROGRESS NOTES
Subjective   Patient ID: Radha Epstein is a 29 y.o. female who presents June 25, 2024 for low back pain.    (11/25) VPCY  JAZMYN: 7/8/24    Today, the patient rates their degree of pain as a 4 out of 10 on the numeric pain rating scale.     Radha returns for continued treatment of low back pain. Patient states that she has been having more low back pain. She states that she was having contractions this past Friday but she did not go into labor. She states that she has been having more upper back tension this weekend. Denies any associated symptoms or change in medical history since last visit and will follow up in 1 week.            Objective   Physical Exam  Constitutional:       General: She is not in acute distress.     Appearance: Normal appearance.       HENT:      Head: Normocephalic and atraumatic.   Neurological:      General: No focal deficit present.      Mental Status: She is alert and oriented to person, place, and time.      Cranial Nerves: No dysarthria or facial asymmetry.      Sensory: Sensation is intact.      Motor: Motor function is intact.      Coordination: Coordination is intact.      Gait: Gait is intact.   Psychiatric:         Attention and Perception: Attention normal.         Mood and Affect: Mood normal.         Speech: Speech normal.         Behavior: Behavior is cooperative.         Palpation of the following region(s) revealed:  Cervical: Upper trapezius bilateral, hypertonicity and tenderness.  Levator scapulae bilateral, hypertonicity and tenderness.  Cervical paraspinals bilateral, hypertonicity and tenderness.  Thoracic: Thoracic paraspinals bilateral, hypertonicity and tenderness.  Lumbar: Lumbar paraspinals bilateral, hypertonicity and tenderness.  Quadratus lumborum bilateral, hypertonicity and tenderness.        Segmental Joint(s): Segmental joint dysfunction was assessed with motion palpation and is identified in the following areas:  Cervical : C5 C6  Thoracic : T3, T7, and  T10  Lumbopelvic / Sacral SIJ : L4, L5, L5/S1, and L SIJ  Upper / Lower Extremities : R Hip and L Hip      Assessment/Plan   Today's Treatment Included: Chiropractic manipulation to the Segmental Joint(s) Cervical : C5 C6 Segmental Joint(s) Lumbopelvic/Sacral SIJ : L4, L5, L5/S1, and L SIJ Segmental Joint(s) Thoracic : T3, T7, and T10 Segmental Joints Upper/Lower Extremities : R Hip and L Hip  Treatment Techniques Used : Diversified CMT and Manual traction  Neuromuscular Re-Education (41708): Start time: 8:25 am End time: 8:35 am  1 Units  Pin and stretch  Ischemic compression  Soft-Tissue manipulation    Soft-tissue mobilization was performed in the following areas:   Cervical paraspinal mm. bilateral, Upper Trapezius bilateral, and Levator Scap. bilateral  Thoracic Paraspinal mm. bilateral  Lumbar Paraspinal mm. bilateral and Quadratus Lumborum bilateral    A belly pillow was used without issue.        The patient tolerated today's treatment with little or no additional discomfort and was instructed to contact the office for questions or concerns.

## 2024-06-26 ENCOUNTER — ANESTHESIA EVENT (OUTPATIENT)
Dept: OBSTETRICS AND GYNECOLOGY | Facility: HOSPITAL | Age: 29
End: 2024-06-26
Payer: COMMERCIAL

## 2024-06-26 ENCOUNTER — ANESTHESIA (OUTPATIENT)
Dept: OBSTETRICS AND GYNECOLOGY | Facility: HOSPITAL | Age: 29
End: 2024-06-26
Payer: COMMERCIAL

## 2024-06-26 ENCOUNTER — HOSPITAL ENCOUNTER (INPATIENT)
Facility: HOSPITAL | Age: 29
LOS: 2 days | Discharge: HOME | End: 2024-06-28
Attending: OBSTETRICS & GYNECOLOGY | Admitting: ADVANCED PRACTICE MIDWIFE
Payer: COMMERCIAL

## 2024-06-26 DIAGNOSIS — Z91.89 AT RISK FOR INEFFECTIVE BREASTFEEDING: ICD-10-CM

## 2024-06-26 LAB
ERYTHROCYTE [DISTWIDTH] IN BLOOD BY AUTOMATED COUNT: 13.1 % (ref 11.5–14.5)
HCT VFR BLD AUTO: 35.4 % (ref 36–46)
HGB BLD-MCNC: 11.9 G/DL (ref 12–16)
MCH RBC QN AUTO: 31.1 PG (ref 26–34)
MCHC RBC AUTO-ENTMCNC: 33.6 G/DL (ref 32–36)
MCV RBC AUTO: 92 FL (ref 80–100)
NRBC BLD-RTO: 0 /100 WBCS (ref 0–0)
PLATELET # BLD AUTO: 224 X10*3/UL (ref 150–450)
RBC # BLD AUTO: 3.83 X10*6/UL (ref 4–5.2)
WBC # BLD AUTO: 15.7 X10*3/UL (ref 4.4–11.3)

## 2024-06-26 PROCEDURE — 36415 COLL VENOUS BLD VENIPUNCTURE: CPT | Performed by: ADVANCED PRACTICE MIDWIFE

## 2024-06-26 PROCEDURE — 1120000001 HC OB PRIVATE ROOM DAILY

## 2024-06-26 PROCEDURE — 85027 COMPLETE CBC AUTOMATED: CPT | Performed by: ADVANCED PRACTICE MIDWIFE

## 2024-06-26 PROCEDURE — 2500000005 HC RX 250 GENERAL PHARMACY W/O HCPCS: Performed by: NURSE ANESTHETIST, CERTIFIED REGISTERED

## 2024-06-26 PROCEDURE — 86780 TREPONEMA PALLIDUM: CPT | Mod: AHULAB | Performed by: ADVANCED PRACTICE MIDWIFE

## 2024-06-26 PROCEDURE — 3700000014 EPIDURAL BLOCK: Performed by: NURSE ANESTHETIST, CERTIFIED REGISTERED

## 2024-06-26 PROCEDURE — 2500000004 HC RX 250 GENERAL PHARMACY W/ HCPCS (ALT 636 FOR OP/ED): Performed by: ADVANCED PRACTICE MIDWIFE

## 2024-06-26 PROCEDURE — 86901 BLOOD TYPING SEROLOGIC RH(D): CPT | Performed by: ADVANCED PRACTICE MIDWIFE

## 2024-06-26 RX ORDER — METOCLOPRAMIDE HYDROCHLORIDE 5 MG/ML
10 INJECTION INTRAMUSCULAR; INTRAVENOUS EVERY 6 HOURS PRN
Status: DISCONTINUED | OUTPATIENT
Start: 2024-06-26 | End: 2024-06-27

## 2024-06-26 RX ORDER — SODIUM CHLORIDE, SODIUM LACTATE, POTASSIUM CHLORIDE, CALCIUM CHLORIDE 600; 310; 30; 20 MG/100ML; MG/100ML; MG/100ML; MG/100ML
125 INJECTION, SOLUTION INTRAVENOUS CONTINUOUS
Status: DISCONTINUED | OUTPATIENT
Start: 2024-06-27 | End: 2024-06-27

## 2024-06-26 RX ORDER — OXYTOCIN 10 [USP'U]/ML
10 INJECTION, SOLUTION INTRAMUSCULAR; INTRAVENOUS ONCE AS NEEDED
OUTPATIENT
Start: 2024-06-26

## 2024-06-26 RX ORDER — MISOPROSTOL 200 UG/1
800 TABLET ORAL ONCE AS NEEDED
OUTPATIENT
Start: 2024-06-26

## 2024-06-26 RX ORDER — METHYLERGONOVINE MALEATE 0.2 MG/ML
0.2 INJECTION INTRAVENOUS ONCE AS NEEDED
OUTPATIENT
Start: 2024-06-26

## 2024-06-26 RX ORDER — METOCLOPRAMIDE 10 MG/1
10 TABLET ORAL EVERY 6 HOURS PRN
Status: DISCONTINUED | OUTPATIENT
Start: 2024-06-26 | End: 2024-06-27

## 2024-06-26 RX ORDER — LIDOCAINE HYDROCHLORIDE 10 MG/ML
30 INJECTION INFILTRATION; PERINEURAL ONCE AS NEEDED
OUTPATIENT
Start: 2024-06-26

## 2024-06-26 RX ORDER — FENTANYL/ROPIVACAINE/NS/PF 2MCG/ML-.2
0-25 PLASTIC BAG, INJECTION (ML) INJECTION CONTINUOUS
Status: DISCONTINUED | OUTPATIENT
Start: 2024-06-27 | End: 2024-06-27

## 2024-06-26 RX ORDER — LABETALOL HYDROCHLORIDE 5 MG/ML
20 INJECTION, SOLUTION INTRAVENOUS ONCE AS NEEDED
Status: DISCONTINUED | OUTPATIENT
Start: 2024-06-26 | End: 2024-06-27

## 2024-06-26 RX ORDER — TERBUTALINE SULFATE 1 MG/ML
0.25 INJECTION SUBCUTANEOUS ONCE AS NEEDED
Status: DISCONTINUED | OUTPATIENT
Start: 2024-06-26 | End: 2024-06-27

## 2024-06-26 RX ORDER — OXYTOCIN/0.9 % SODIUM CHLORIDE 30/500 ML
60 PLASTIC BAG, INJECTION (ML) INTRAVENOUS ONCE AS NEEDED
OUTPATIENT
Start: 2024-06-26

## 2024-06-26 RX ORDER — CARBOPROST TROMETHAMINE 250 UG/ML
250 INJECTION, SOLUTION INTRAMUSCULAR ONCE AS NEEDED
OUTPATIENT
Start: 2024-06-26

## 2024-06-26 RX ORDER — TRANEXAMIC ACID 100 MG/ML
1000 INJECTION, SOLUTION INTRAVENOUS ONCE AS NEEDED
OUTPATIENT
Start: 2024-06-26

## 2024-06-26 RX ORDER — LOPERAMIDE HYDROCHLORIDE 2 MG/1
4 CAPSULE ORAL EVERY 2 HOUR PRN
OUTPATIENT
Start: 2024-06-26

## 2024-06-26 RX ORDER — ONDANSETRON 4 MG/1
4 TABLET, FILM COATED ORAL EVERY 6 HOURS PRN
Status: DISCONTINUED | OUTPATIENT
Start: 2024-06-26 | End: 2024-06-27

## 2024-06-26 RX ORDER — ONDANSETRON HYDROCHLORIDE 2 MG/ML
4 INJECTION, SOLUTION INTRAVENOUS EVERY 6 HOURS PRN
Status: DISCONTINUED | OUTPATIENT
Start: 2024-06-26 | End: 2024-06-27

## 2024-06-26 RX ORDER — HYDRALAZINE HYDROCHLORIDE 20 MG/ML
5 INJECTION INTRAMUSCULAR; INTRAVENOUS ONCE AS NEEDED
Status: DISCONTINUED | OUTPATIENT
Start: 2024-06-26 | End: 2024-06-27

## 2024-06-26 RX ORDER — NIFEDIPINE 10 MG/1
10 CAPSULE ORAL ONCE AS NEEDED
Status: DISCONTINUED | OUTPATIENT
Start: 2024-06-26 | End: 2024-06-27

## 2024-06-26 SDOH — HEALTH STABILITY: MENTAL HEALTH: WISH TO BE DEAD (PAST 1 MONTH): NO

## 2024-06-26 SDOH — SOCIAL STABILITY: SOCIAL INSECURITY: PHYSICAL ABUSE: DENIES

## 2024-06-26 SDOH — SOCIAL STABILITY: SOCIAL INSECURITY: ABUSE SCREEN: ADULT

## 2024-06-26 SDOH — SOCIAL STABILITY: SOCIAL INSECURITY: HAVE YOU HAD THOUGHTS OF HARMING ANYONE ELSE?: NO

## 2024-06-26 SDOH — SOCIAL STABILITY: SOCIAL INSECURITY: ARE YOU OR HAVE YOU BEEN THREATENED OR ABUSED PHYSICALLY, EMOTIONALLY, OR SEXUALLY BY ANYONE?: NO

## 2024-06-26 SDOH — SOCIAL STABILITY: SOCIAL INSECURITY: DO YOU FEEL ANYONE HAS EXPLOITED OR TAKEN ADVANTAGE OF YOU FINANCIALLY OR OF YOUR PERSONAL PROPERTY?: NO

## 2024-06-26 SDOH — ECONOMIC STABILITY: HOUSING INSECURITY: DO YOU FEEL UNSAFE GOING BACK TO THE PLACE WHERE YOU ARE LIVING?: NO

## 2024-06-26 SDOH — HEALTH STABILITY: MENTAL HEALTH: NON-SPECIFIC ACTIVE SUICIDAL THOUGHTS (PAST 1 MONTH): NO

## 2024-06-26 SDOH — SOCIAL STABILITY: SOCIAL INSECURITY: VERBAL ABUSE: DENIES

## 2024-06-26 SDOH — HEALTH STABILITY: MENTAL HEALTH: SUICIDAL BEHAVIOR (LIFETIME): NO

## 2024-06-26 SDOH — SOCIAL STABILITY: SOCIAL INSECURITY: HAVE YOU HAD ANY THOUGHTS OF HARMING ANYONE ELSE?: NO

## 2024-06-26 SDOH — SOCIAL STABILITY: SOCIAL INSECURITY: ARE THERE ANY APPARENT SIGNS OF INJURIES/BEHAVIORS THAT COULD BE RELATED TO ABUSE/NEGLECT?: NO

## 2024-06-26 SDOH — HEALTH STABILITY: MENTAL HEALTH: WERE YOU ABLE TO COMPLETE ALL THE BEHAVIORAL HEALTH SCREENINGS?: NO

## 2024-06-26 SDOH — SOCIAL STABILITY: SOCIAL INSECURITY: HAS ANYONE EVER THREATENED TO HURT YOUR FAMILY OR YOUR PETS?: NO

## 2024-06-26 SDOH — SOCIAL STABILITY: SOCIAL INSECURITY: DOES ANYONE TRY TO KEEP YOU FROM HAVING/CONTACTING OTHER FRIENDS OR DOING THINGS OUTSIDE YOUR HOME?: NO

## 2024-06-26 ASSESSMENT — ACTIVITIES OF DAILY LIVING (ADL): LACK_OF_TRANSPORTATION: NO

## 2024-06-26 ASSESSMENT — LIFESTYLE VARIABLES
SKIP TO QUESTIONS 9-10: 1
HOW OFTEN DO YOU HAVE 6 OR MORE DRINKS ON ONE OCCASION: NEVER
AUDIT-C TOTAL SCORE: 0
HOW MANY STANDARD DRINKS CONTAINING ALCOHOL DO YOU HAVE ON A TYPICAL DAY: PATIENT DOES NOT DRINK
AUDIT-C TOTAL SCORE: 0
HOW OFTEN DO YOU HAVE A DRINK CONTAINING ALCOHOL: NEVER

## 2024-06-26 ASSESSMENT — PATIENT HEALTH QUESTIONNAIRE - PHQ9
SUM OF ALL RESPONSES TO PHQ9 QUESTIONS 1 & 2: 0
1. LITTLE INTEREST OR PLEASURE IN DOING THINGS: NOT AT ALL
2. FEELING DOWN, DEPRESSED OR HOPELESS: NOT AT ALL

## 2024-06-26 ASSESSMENT — PAIN SCALES - GENERAL: PAINLEVEL_OUTOF10: 7

## 2024-06-27 ENCOUNTER — PHARMACY VISIT (OUTPATIENT)
Dept: PHARMACY | Facility: CLINIC | Age: 29
End: 2024-06-27
Payer: COMMERCIAL

## 2024-06-27 LAB
ABO GROUP (TYPE) IN BLOOD: NORMAL
ANTIBODY SCREEN: NORMAL
RH FACTOR (ANTIGEN D): NORMAL
TREPONEMA PALLIDUM IGG+IGM AB [PRESENCE] IN SERUM OR PLASMA BY IMMUNOASSAY: NONREACTIVE

## 2024-06-27 PROCEDURE — 2500000001 HC RX 250 WO HCPCS SELF ADMINISTERED DRUGS (ALT 637 FOR MEDICARE OP): Performed by: ADVANCED PRACTICE MIDWIFE

## 2024-06-27 PROCEDURE — RXMED WILLOW AMBULATORY MEDICATION CHARGE

## 2024-06-27 PROCEDURE — 2500000005 HC RX 250 GENERAL PHARMACY W/O HCPCS: Performed by: ADVANCED PRACTICE MIDWIFE

## 2024-06-27 PROCEDURE — 59409 OBSTETRICAL CARE: CPT | Performed by: ADVANCED PRACTICE MIDWIFE

## 2024-06-27 PROCEDURE — 2500000004 HC RX 250 GENERAL PHARMACY W/ HCPCS (ALT 636 FOR OP/ED): Performed by: ADVANCED PRACTICE MIDWIFE

## 2024-06-27 PROCEDURE — 7100000016 HC LABOR RECOVERY PER HOUR

## 2024-06-27 PROCEDURE — 59050 FETAL MONITOR W/REPORT: CPT

## 2024-06-27 PROCEDURE — 2060000001 HC INTERMEDIATE ICU ROOM DAILY

## 2024-06-27 PROCEDURE — 59400 OBSTETRICAL CARE: CPT | Performed by: ADVANCED PRACTICE MIDWIFE

## 2024-06-27 PROCEDURE — 7210000002 HC LABOR PER HOUR

## 2024-06-27 PROCEDURE — 2500000004 HC RX 250 GENERAL PHARMACY W/ HCPCS (ALT 636 FOR OP/ED): Performed by: NURSE ANESTHETIST, CERTIFIED REGISTERED

## 2024-06-27 RX ORDER — ONDANSETRON HYDROCHLORIDE 2 MG/ML
4 INJECTION, SOLUTION INTRAVENOUS EVERY 6 HOURS PRN
Status: DISCONTINUED | OUTPATIENT
Start: 2024-06-27 | End: 2024-06-28 | Stop reason: HOSPADM

## 2024-06-27 RX ORDER — LIDOCAINE HYDROCHLORIDE 10 MG/ML
INJECTION INFILTRATION; PERINEURAL AS NEEDED
Status: DISCONTINUED | OUTPATIENT
Start: 2024-06-26 | End: 2024-06-27

## 2024-06-27 RX ORDER — NIFEDIPINE 10 MG/1
10 CAPSULE ORAL ONCE AS NEEDED
Status: DISCONTINUED | OUTPATIENT
Start: 2024-06-27 | End: 2024-06-28 | Stop reason: HOSPADM

## 2024-06-27 RX ORDER — ACETAMINOPHEN 500 MG
1000 TABLET ORAL EVERY 6 HOURS PRN
Qty: 60 TABLET | Refills: 0 | Status: SHIPPED | OUTPATIENT
Start: 2024-06-27

## 2024-06-27 RX ORDER — ACETAMINOPHEN 325 MG/1
975 TABLET ORAL EVERY 6 HOURS
Status: DISCONTINUED | OUTPATIENT
Start: 2024-06-27 | End: 2024-06-28 | Stop reason: HOSPADM

## 2024-06-27 RX ORDER — IBUPROFEN 600 MG/1
600 TABLET ORAL EVERY 6 HOURS
Status: DISCONTINUED | OUTPATIENT
Start: 2024-06-27 | End: 2024-06-28 | Stop reason: HOSPADM

## 2024-06-27 RX ORDER — DEXMEDETOMIDINE IN 0.9 % NACL 20 MCG/5ML
SYRINGE (ML) INTRAVENOUS AS NEEDED
Status: DISCONTINUED | OUTPATIENT
Start: 2024-06-27 | End: 2024-06-27

## 2024-06-27 RX ORDER — SIMETHICONE 80 MG
80 TABLET,CHEWABLE ORAL 4 TIMES DAILY PRN
Status: DISCONTINUED | OUTPATIENT
Start: 2024-06-27 | End: 2024-06-28 | Stop reason: HOSPADM

## 2024-06-27 RX ORDER — OXYTOCIN/0.9 % SODIUM CHLORIDE 30/500 ML
60 PLASTIC BAG, INJECTION (ML) INTRAVENOUS ONCE AS NEEDED
Status: COMPLETED | OUTPATIENT
Start: 2024-06-27 | End: 2024-06-27

## 2024-06-27 RX ORDER — DIPHENHYDRAMINE HYDROCHLORIDE 50 MG/ML
25 INJECTION INTRAMUSCULAR; INTRAVENOUS EVERY 6 HOURS PRN
Status: DISCONTINUED | OUTPATIENT
Start: 2024-06-27 | End: 2024-06-28 | Stop reason: HOSPADM

## 2024-06-27 RX ORDER — LOPERAMIDE HYDROCHLORIDE 2 MG/1
4 CAPSULE ORAL EVERY 2 HOUR PRN
Status: DISCONTINUED | OUTPATIENT
Start: 2024-06-27 | End: 2024-06-28 | Stop reason: HOSPADM

## 2024-06-27 RX ORDER — IBUPROFEN 600 MG/1
600 TABLET ORAL EVERY 6 HOURS PRN
Qty: 30 TABLET | Refills: 0 | Status: SHIPPED | OUTPATIENT
Start: 2024-06-27

## 2024-06-27 RX ORDER — OXYTOCIN 10 [USP'U]/ML
10 INJECTION, SOLUTION INTRAMUSCULAR; INTRAVENOUS ONCE AS NEEDED
Status: DISCONTINUED | OUTPATIENT
Start: 2024-06-27 | End: 2024-06-28 | Stop reason: HOSPADM

## 2024-06-27 RX ORDER — LABETALOL HYDROCHLORIDE 5 MG/ML
20 INJECTION, SOLUTION INTRAVENOUS ONCE AS NEEDED
Status: DISCONTINUED | OUTPATIENT
Start: 2024-06-27 | End: 2024-06-28 | Stop reason: HOSPADM

## 2024-06-27 RX ORDER — HYDRALAZINE HYDROCHLORIDE 20 MG/ML
5 INJECTION INTRAMUSCULAR; INTRAVENOUS ONCE AS NEEDED
Status: DISCONTINUED | OUTPATIENT
Start: 2024-06-27 | End: 2024-06-28 | Stop reason: HOSPADM

## 2024-06-27 RX ORDER — BISACODYL 10 MG/1
10 SUPPOSITORY RECTAL DAILY PRN
Status: DISCONTINUED | OUTPATIENT
Start: 2024-06-27 | End: 2024-06-28 | Stop reason: HOSPADM

## 2024-06-27 RX ORDER — POLYETHYLENE GLYCOL 3350 17 G/17G
17 POWDER, FOR SOLUTION ORAL 2 TIMES DAILY PRN
Status: DISCONTINUED | OUTPATIENT
Start: 2024-06-27 | End: 2024-06-28 | Stop reason: HOSPADM

## 2024-06-27 RX ORDER — ADHESIVE BANDAGE
10 BANDAGE TOPICAL
Status: DISCONTINUED | OUTPATIENT
Start: 2024-06-27 | End: 2024-06-28 | Stop reason: HOSPADM

## 2024-06-27 RX ORDER — DIPHENHYDRAMINE HCL 25 MG
25 CAPSULE ORAL EVERY 6 HOURS PRN
Status: DISCONTINUED | OUTPATIENT
Start: 2024-06-27 | End: 2024-06-28 | Stop reason: HOSPADM

## 2024-06-27 RX ORDER — TRANEXAMIC ACID 100 MG/ML
1000 INJECTION, SOLUTION INTRAVENOUS ONCE AS NEEDED
Status: DISCONTINUED | OUTPATIENT
Start: 2024-06-27 | End: 2024-06-28 | Stop reason: HOSPADM

## 2024-06-27 RX ORDER — LIDOCAINE 560 MG/1
1 PATCH PERCUTANEOUS; TOPICAL; TRANSDERMAL
Status: DISCONTINUED | OUTPATIENT
Start: 2024-06-27 | End: 2024-06-28 | Stop reason: HOSPADM

## 2024-06-27 RX ORDER — CARBOPROST TROMETHAMINE 250 UG/ML
250 INJECTION, SOLUTION INTRAMUSCULAR ONCE AS NEEDED
Status: DISCONTINUED | OUTPATIENT
Start: 2024-06-27 | End: 2024-06-28 | Stop reason: HOSPADM

## 2024-06-27 RX ORDER — ONDANSETRON 4 MG/1
4 TABLET, FILM COATED ORAL EVERY 6 HOURS PRN
Status: DISCONTINUED | OUTPATIENT
Start: 2024-06-27 | End: 2024-06-28 | Stop reason: HOSPADM

## 2024-06-27 RX ORDER — MISOPROSTOL 200 UG/1
800 TABLET ORAL ONCE AS NEEDED
Status: DISCONTINUED | OUTPATIENT
Start: 2024-06-27 | End: 2024-06-28 | Stop reason: HOSPADM

## 2024-06-27 RX ORDER — METHYLERGONOVINE MALEATE 0.2 MG/ML
0.2 INJECTION INTRAVENOUS ONCE AS NEEDED
Status: DISCONTINUED | OUTPATIENT
Start: 2024-06-27 | End: 2024-06-28 | Stop reason: HOSPADM

## 2024-06-27 ASSESSMENT — PAIN DESCRIPTION - LOCATION: LOCATION: ABDOMEN

## 2024-06-27 ASSESSMENT — PAIN SCALES - GENERAL
PAINLEVEL_OUTOF10: 0 - NO PAIN
PAINLEVEL_OUTOF10: 2
PAINLEVEL_OUTOF10: 0 - NO PAIN
PAINLEVEL_OUTOF10: 0 - NO PAIN
PAINLEVEL_OUTOF10: 8
PAINLEVEL_OUTOF10: 1
PAINLEVEL_OUTOF10: 0 - NO PAIN

## 2024-06-27 NOTE — ANESTHESIA PROCEDURE NOTES
Epidural Block    Patient location during procedure: OB  Start time: 6/26/2024 11:35 PM  End time: 6/26/2024 11:58 PM  Reason for block: labor analgesia  Staffing  Performed: CRNA   Authorized by: MELANY Segura DNP    Performed by: MELANY Segura DNP    Preanesthetic Checklist  Completed: patient identified, IV checked, risks and benefits discussed, surgical consent, pre-op evaluation, timeout performed and sterile techniques followed  Block Timeout  RN/Licensed healthcare professional reads aloud to the Anesthesia provider and entire team: Patient identity, procedure with side and site, patient position, and as applicable the availability of implants/special equipment/special requirements.  Patient on coagulant treatment: no  Timeout performed at: 6/26/2024 11:36 PM  Block Placement  Patient position: sitting  Prep: ChloraPrep  Sterility prep: cap, drape, gloves, hand and mask  Sedation level: no sedation  Patient monitoring: continuous pulse oximetry and blood pressure  Approach: midline  Local numbing: lidocaine 1% to skin and subcutaneous tissues  Vertebral space: lumbar  Lumbar location: L4-L5  Epidural  Loss of resistance technique: saline  Guidance: landmark technique        Needle  Needle type: Tuohy   Needle gauge: 17  Needle insertion depth: 4 cm  Catheter type: end hole  Catheter at skin depth: 9 cm  Catheter securement method: clear occlusive dressing, liquid medical adhesive and surgical tape    Test dose: lidocaine 1.5% with epinephrine 1-to-200,000  Test dose: lidocaine 1.5% with epinephrine 1-to-200,000  Test dose result: no positive test dose    PCEA  Medication concentration used: 0.2% Ropivacaine with 2 mcg/mL Fentanyl  Dose (mL): 5  Lockout (minutes): 30  1-Hour Limit (boluses/hr): 2  Basal Rate: 8        Assessment  Events: no positive test dose  Procedure assessment: patient tolerated procedure well with no immediate complications  Additional Notes  Epidural  placed with multiple attempts at single level with bone encountered. 2353: 2ml 1.5% lido w 1:200k epi given per epidural. 0004: Pt feeling painful contractions on right side only. Level checked on left side, T12. Repositioned onto right side and epidural catheter pulled back from 9 to 8 cm at skin. Clinician bolus 3 ml given via pump. 0027: Pt continues to be uncomfortable with contractions. Left T10 and right L2 assessed. Additional clinician bolus given via pump, 2ml. 0040: Pt reports that she is still feeling some discomfort on the right side but overall more comfortable.

## 2024-06-27 NOTE — ANESTHESIA POSTPROCEDURE EVALUATION
"Patient: Radha Epstein    Procedure Summary       Date: 06/26/24 Room / Location:     Anesthesia Start: 2335 Anesthesia Stop: 06/27/24 0223    Procedure: Labor Analgesia Diagnosis:     Scheduled Providers:  Responsible Provider: JERSEY Segura-JONAH, ADAMA    Anesthesia Type: epidural ASA Status: 2            Anesthesia Type: epidural    /62   Pulse 81   Temp 36.1 °C (97 °F)   Resp 16   Ht 1.626 m (5' 4.02\")   Wt 73.5 kg (162 lb 0.6 oz)   LMP 10/03/2023 (Within Days)   SpO2 99%   Breastfeeding Yes   BMI 27.80 kg/m²        Anesthesia Post Evaluation    Patient location during evaluation: bedside  Patient participation: complete - patient participated  Level of consciousness: awake and alert  Pain management: adequate  Airway patency: patent  Cardiovascular status: acceptable  Respiratory status: acceptable and room air  Hydration status: acceptable  Postoperative Nausea and Vomiting: none  Comments: Epidural catheter removed by nursing. No redness, swelling, or drainage at puncture site.    Complete resolution of numbness. Patient is able to lift legs, bend at the knees, and ambulate.    Patient denies problems with urination.    Patient denies nausea, headache or severe back pain.       There were no known notable events for this encounter.    "

## 2024-06-27 NOTE — ANESTHESIA PREPROCEDURE EVALUATION
Patient: Radha Epstein    Evaluation Method: In-person visit    Procedure Information    Date: 24  Procedure: Labor Analgesia     Pt  @ 38.2 weeks, admitted in labor.     Relevant Problems   Anesthesia (within normal limits)      Cardiac (within normal limits)      Pulmonary (within normal limits)      Neuro (within normal limits)      GI (within normal limits)      /Renal (within normal limits)      Liver (within normal limits)      Endocrine (within normal limits)      Hematology (within normal limits)      Musculoskeletal  H/o bulging disc at L4-5, s/p chiropractor, acupuncture, steroid injections about 10 years ago. No current issues, numbness, tingling, weakness.       GYN   (+) Supervision of other normal pregnancy, antepartum ([facility])       Clinical information reviewed:   Tobacco  Allergies  Meds  Problems  Med Hx  Surg Hx   Fam Hx  Soc   Hx        NPO Detail:  NPO/Void Status  Date of Last Liquid: 24  Time of Last Liquid:   Date of Last Solid: 24  Time of Last Solid:          OB/Gyn Evaluation    Present Pregnancy    Patient is pregnant now.   Obstetric History            Physical Exam    Airway  Mallampati: II  TM distance: >3 FB  Neck ROM: full     Cardiovascular    Dental    Pulmonary    Abdominal          Anesthesia Plan    History of general anesthesia?: no  History of complications of general anesthesia?: unknown/emergency    ASA 2     epidural   (I informed and discussed the risks and benefits of general, spinal and epidural anesthesia with the patient.  The patient expressed her understanding and her questions were answered.  A verbal consent was given by the patient.   )  Anesthetic plan and risks discussed with patient.  Use of blood products discussed with patient who consented to blood products.

## 2024-06-27 NOTE — H&P
Obstetrical Admission History and Physical     Radha Epstein is a 29 y.o.  at 38w2d. JAZMYN: 2024, by Ultrasound. Estimated fetal weight: 7lbs. She has had prenatal care with Antonina Jefferson CNM .  Presents with c/o ROM at approx 2130 this evening. Clear fluid.     Assessment    Radha Epstein is a 29 y.o.  at 38w2d. JAZMYN: 2024, by Ultrasound.   FHT Category 1  Active labor  Grossly ruptured on assessment, pos nitrizine and pooling as well  Patient is tali every 2 minutes and very uncomfortable.   Cervical exam 4-5/70/-3  Vertex by sutures and by US    Plan    Admit to labor and delivery  Monitor vital signs per unit protocol  Routine labs ordered   Encourage frequent position changes  Regular diet, clear liquid diet with epidural  Continuous fetal monitoring  Pain management per patient request  Continue assessment of maternal and fetal well-being  Recheck as clinically indicted by maternal or fetal status  Anticipate SVB  Dr. Zimmerman aware of admission    Patient would like an epidural as soon as possible, will help accommodate, anesthesia notified of admission    AMELIA Albarado    Subjective     Chief Complaint: Contractions and Leakage/Loss of Fluid    Radha is here complaining of q2 min contractions and SROM at 2130p.m. today of clear fluid. Good fetal movement. Denies vaginal bleeding., Having contractions q 2 minutes.         Pregnancy Problems (from 23 to present)       Problem Noted Resolved    Supervision of other normal pregnancy, antepartum ([facility]) 2024 by AMELIA Milton No    Priority:  Medium      Overview Addendum 6/10/2024  9:00 AM by AMELIA Milton     Desired provider in labor: [x] CNM  [] Physician  [x] Initial BMI: 24.71  [x] Prenatal Labs: wnl  [x] Rh status: positive  [x] Genetic Screening:  RR cfDNA GIRL  [x] Dating confirmation: US at 9.4 Wks on 23    [x] Anatomy US: views initially  incomplete--> anatomy wnl though placenta posterior, low-lying, plan for FU at 30 wks--> RESOLVED    [x] 1hr GCT at 24-28wks:  [x] Tdap (27-36wks): done 24  [x] Breastfeeding  [x] Pain management during labor: considering NCB but open to epidural  [x] Postpartum Birth control method: lng IUD  [x] GBS at 36 wks: done 6/10  [x] 39 weeks discussion of IOL vs. Expectant management: 39.0 wk IOL  [x] Mode of delivery:  vaginal         Low-lying placenta ([facility]) 2024 by AMELIA Milton 2024 by Efraín Nguyen MD    Overview Addendum 2024  9:51 AM by AMELIA Milton     Posterior, 1.2 cm from internal os  Plan for follow-up at 30 weeks--> RESOLVED!                  Obstetrical History   OB History    Para Term  AB Living   2 1 1     1   SAB IAB Ectopic Multiple Live Births           1      # Outcome Date GA Lbr Vasu/2nd Weight Sex Delivery Anes PTL Lv   2 Current            1 Term 23    F Vag-Spont EPI N JASPAL       Past Medical History  Past Medical History:   Diagnosis Date    Encounter for immunization     COVID-19 vaccine administered    Low-lying placenta ([facility]) 2024    Posterior, 1.2 cm from internal os  Plan for follow-up at 30 weeks--> RESOLVED!    Pain in thoracic spine 10/04/2022    Thoracic spine pain    Segmental and somatic dysfunction of rib cage 10/04/2022    Rib cage dysfunction        Past Surgical History   History reviewed. No pertinent surgical history.    Social History  Social History     Tobacco Use    Smoking status: Never    Smokeless tobacco: Never   Substance Use Topics    Alcohol use: Not Currently     Substance and Sexual Activity   Drug Use Never       Allergies  Penicillins     Medications  Medications Prior to Admission   Medication Sig Dispense Refill Last Dose    magnesium 30 mg tablet Take 1 tablet (30 mg) by mouth 2 times a day. Pt is unsure about dose amount       prenatal vitamin calcium-iron-folic 27 mg  iron- 1 mg tablet Take 1 tablet by mouth once daily.          Objective    Last Vitals  Temp Pulse Resp BP MAP O2 Sat   36.1 °C (97 °F) 98 (Simultaneous filing. User may not have seen previous data.) 16 133/67 (Simultaneous filing. User may not have seen previous data.)   98 %     Physical Examination    GENERAL: Examination reveals a well developed, well nourished, gravid female in no acute distress. She is alert and cooperative.  FHR is  , with Accelerations, and a   tracing.    Conception reading:    The fetus is in a vertex presentation, determined by ultrasound  CERVIX: 4 cm dilated, 70 % effaced, -3 station; MEMBRANES are    PSYCHOLOGICAL: awake and alert; oriented to person, place, and time    Fetal Monitoring      Baseline FHR: 135 per minute  Variability: moderate  Accelerations: yes  Decelerations: none  TOCO: regular, every 2 minutes    Cervical Exam:  4-5 cm dilated, 70 effaced, -3 station    Membrane status: ruptured, clear fluid      Lab Review  Labs in chart were reviewed.     GBS neg  O pos  HIV/hepB/hepC/Syphilis neg  Normal one hour glucose  Gc/Ct neg

## 2024-06-27 NOTE — LACTATION NOTE
Lactation Consultant Note  Lactation Consultation  Reason for Consult: Initial assessment  Consultant Name: Lauren CARVAJAL    Maternal Information  Has mother  before?: Yes  How long did the mother previously breastfeed?: 3 months  Infant to breast within first 2 hours of birth?: Yes  Exclusive Pump and Bottle Feed: No    Maternal Assessment  Breast Assessment: Large, Soft, Compressible  Nipple Assessment: Intact, Erect  Areola Assessment: Normal    Infant Assessment  Infant Behavior: Awake  Infant Assessment: Good cupping of tongue, Good lateral movement of tongue    Feeding Assessment  Nutrition Source: Breastmilk  Feeding Method: Nursing at the breast  Feeding Position: Cradle, Cross - cradle  Suck/Feeding: Sustained, Tactile stimulation needed  Latch Assessment: Minimal assistance is needed, Shallow latch, Deep latch obtained, Flanged lips, Chin moves in rhythmic motion    LATCH TOOL  Latch: Grasps breast, tongue down, lips flanged, rhythmic sucking  Audible Swallowing: A few with stimulation  Type of Nipple: Everted (After stimulation)  Comfort (Breast/Nipple): Soft/non-tender  Hold (Positioning): Minimal assist, teach one side, mother does other, staff holds  LATCH Score: 8    Breast Pump       Other OB Lactation Tools       Patient Follow-up  Inpatient Lactation Follow-up Needed : Yes    Other OB Lactation Documentation       Recommendations/Summary  Minimal assitance with latch to the left side in cradle hold. Mom is very easily expressible. Baby latched readily. Bay could be a bit deeper. Mom was shown how to widen baby's gape during latch and support breast to sustain a deeper latch. Reviewed milk supply patterns and  feeding patterns in the fist and second 24 HOL.Mom was asked to attempt/feed baby at least every 2-3 hours or on demand with a goal of 8-12 feeds daily. Feeding cues reviewed.Breastfeeding education reviewed and questions answered. Mom aware of lactation support and asked to call  out for feed assistance and with questions as needed.

## 2024-06-27 NOTE — PROGRESS NOTES
Patient was feeling pain on her right side only.   On exam cervix 9/90/-1.   Unable to reduce manually with pushing efforts.   Repositioned to far right lateral supergirl with peanut ball. Will reassess in 45-60 minutes or PRN.   Anticipate SVB

## 2024-06-28 VITALS
TEMPERATURE: 98.6 F | DIASTOLIC BLOOD PRESSURE: 73 MMHG | RESPIRATION RATE: 16 BRPM | BODY MASS INDEX: 27.66 KG/M2 | OXYGEN SATURATION: 98 % | HEART RATE: 79 BPM | WEIGHT: 162.04 LBS | HEIGHT: 64 IN | SYSTOLIC BLOOD PRESSURE: 138 MMHG

## 2024-06-28 PROBLEM — O13.9 ANTEPARTUM TRANSIENT HYPERTENSION (HHS-HCC): Status: ACTIVE | Noted: 2024-06-28

## 2024-06-28 PROCEDURE — 2500000001 HC RX 250 WO HCPCS SELF ADMINISTERED DRUGS (ALT 637 FOR MEDICARE OP): Performed by: ADVANCED PRACTICE MIDWIFE

## 2024-06-28 ASSESSMENT — PAIN SCALES - GENERAL: PAINLEVEL_OUTOF10: 1

## 2024-06-28 ASSESSMENT — PAIN DESCRIPTION - LOCATION: LOCATION: ABDOMEN

## 2024-06-28 NOTE — PROGRESS NOTES
Postpartum Progress Note    Assessment/Plan   Radha Epstein is a 29 y.o., , who delivered at 38w3d gestation and is now postpartum day 1.      Principal Problem:    Normal labor and delivery ([facility])    Pregnancy Problems (from 23 to present)       Problem Noted Resolved    Supervision of other normal pregnancy, antepartum ([facility]) 2024 by AMELIA Milton No    Priority:  Medium      Overview Addendum 6/10/2024  9:00 AM by AMELIA Milton     Desired provider in labor: [x] CNM  [] Physician  [x] Initial BMI: 24.71  [x] Prenatal Labs: wnl  [x] Rh status: positive  [x] Genetic Screening:  RR cfDNA GIRL  [x] Dating confirmation: US at 9.4 Wks on 23    [x] Anatomy US: views initially incomplete--> anatomy wnl though placenta posterior, low-lying, plan for FU at 30 wks--> RESOLVED    [x] 1hr GCT at 24-28wks:  [x] Tdap (27-36wks): done 24  [x] Breastfeeding  [x] Pain management during labor: considering NCB but open to epidural  [x] Postpartum Birth control method: lng IUD  [x] GBS at 36 wks: done 6/10  [x] 39 weeks discussion of IOL vs. Expectant management: 39.0 wk IOL  [x] Mode of delivery:  vaginal         Low-lying placenta ([facility]) 2024 by AMELIA Milton 2024 by Efraín Nguyen MD    Priority:  Medium      Overview Addendum 2024  9:51 AM by AMELIA Milton     Posterior, 1.2 cm from internal os  Plan for follow-up at 30 weeks--> RESOLVED!               Hospital course: no complications  Vaginal Birth  Patient is currently breastfeedingThe patient's blood type is O POS. The baby's blood type is AB POS . Rhogam is not indicated.    Subjective   Her pain is well controlled with current medications  She is passing flatus  She is ambulating well  She is tolerating a Adult diet Regular  She reports no breast or nursing problems  She denies emotional concerns today   Her plan for contraception is IUD          Objective   Allergies:   Penicillins         Last Vitals:  Temp Pulse Resp BP MAP Pulse Ox   37.1 °C (98.8 °F) (temp recheck) 80 16 133/83   98 %     Vitals Min/Max Last 24 Hours:  Temp  Min: 35.5 °C (95.9 °F)  Max: 37.3 °C (99.2 °F)  Pulse  Min: 80  Max: 88  Resp  Min: 16  Max: 16  BP  Min: 101/61  Max: 158/89    Intake/Output:   No intake or output data in the 24 hours ending 06/28/24 0738    Physical Exam:  General: Examination reveals a well developed, well nourished, female, in no acute distress. She is alert and cooperative.  Breasts: normal.  Abdomen: soft, non-tender.  Fundus: firm and below umbilicus.  Perineum: deferred.  Extremities: no redness or tenderness in the calves or thighs, no edema.  Psychological: awake and alert; oriented to person, place, and time.    Lab Data:  Labs in chart were reviewed.    AMELIA Solis

## 2024-06-28 NOTE — CARE PLAN
The patient's goals for the shift include fetal maternal feeding and bonding    The clinical goals for the shift include patient safety      Problem: Fall/Injury  Goal: Not fall by end of shift  Outcome: Met  Goal: Be free from injury by end of the shift  Outcome: Met  Goal: Verbalize understanding of personal risk factors for fall in the hospital  Outcome: Met  Goal: Verbalize understanding of risk factor reduction measures to prevent injury from fall in the home  Outcome: Met  Goal: Use assistive devices by end of the shift  Outcome: Met  Goal: Pace activities to prevent fatigue by end of the shift  Outcome: Met     Problem: Postpartum  Goal: Experiences normal postpartum course  Outcome: Met  Goal: Appropriate maternal -  bonding  Outcome: Met  Goal: Establish and maintain infant feeding pattern for adequate nutrition  Outcome: Met  Goal: Incisions, wounds, or drain sites healing without S/S of infection  Outcome: Met  Goal: No s/sx infection  Outcome: Met  Goal: No s/sx of hemorrhage  Outcome: Met  Goal: Minimal s/sx of HDP and BP<160/110  Outcome: Met

## 2024-06-28 NOTE — LACTATION NOTE
Lactation Consultant Note  Lactation Consultation  Reason for Consult: Follow-up assessment  Consultant Name: Lauren CARVAJAL    Maternal Information  Has mother  before?: Yes  How long did the mother previously breastfeed?: 3 months  Infant to breast within first 2 hours of birth?: Yes  Exclusive Pump and Bottle Feed: No    Maternal Assessment       Infant Assessment       Feeding Assessment       LATCH TOOL       Breast Pump       Other OB Lactation Tools       Patient Follow-up  Inpatient Lactation Follow-up Needed : No  Lactation Professional - OK to Discharge: Yes    Other OB Lactation Documentation       Recommendations/Summary  Mom reports that feeds are going well. Mom is able to latch baby independently. We reviewed some breastfeeding education. Mom has no further questions at this time. Outpatient lactation discussed. Mom will follow up as needed.

## 2024-06-28 NOTE — DISCHARGE SUMMARY
Discharge Summary    Admission Date: 6/26/2024  Discharge Date: 6/28/2024      Discharge Diagnosis  Normal labor and delivery ([facility])    Hospital Course  Delivery Date: 6/27/2024  2:23 AM   Delivery type: Vaginal, Spontaneous    GA at delivery: 38w3d  Outcome: Living   Anesthesia during delivery: Epidural   Intrapartum complications: None   Feeding method: Breastfeeding Status: Yes     Procedures: none  Contraception at discharge: none  Planning Mirena at 6 weeks pp    Pertinent Physical Exam At Time of Discharge  General: Examination reveals a well developed, well nourished, female, in no acute distress. She is alert and cooperative.  Breasts: normal.  Abdomen: Soft, nontender.  Fundus: firm and below umbilicus.  Perineum: deferred.  Extremities: no redness or tenderness in the calves or thighs, no edema.  Psychological: awake and alert; oriented to person, place, and time.    Discharge Meds     Your medication list        START taking these medications        Instructions Last Dose Given Next Dose Due   ibuprofen 600 mg tablet      Take 1 tablet (600 mg) by mouth every 6 hours if needed for mild pain (1 - 3).       lanolin cream  Commonly known as: Lansinoh      Apply 1 Application topically every 4 hours if needed for dry skin.       Pain Relief ES (acetaminophen) 500 mg tablet  Generic drug: acetaminophen      Take 2 tablets (1,000 mg) by mouth every 6 hours if needed for mild pain (1 - 3).              CONTINUE taking these medications        Instructions Last Dose Given Next Dose Due   magnesium 30 mg tablet           prenatal vitamin calcium-iron-folic 27 mg iron- 1 mg tablet                     Where to Get Your Medications        These medications were sent to [facility]  [address], [address], [place] [address]      Hours: 8 AM to 6 PM Mon-Fri, 9 AM to 1 PM Saturday Phone: [phone]   ibuprofen 600 mg tablet  Pain Relief ES (acetaminophen) 500 mg tablet       Information about where  to get these medications is not yet available    Ask your nurse or doctor about these medications  lanolin cream          Complications Requiring Follow-Up  Schedule 4 week pp visit to arrange 6 week Mirena IUD placement     Test Results Pending At Discharge  Pending Labs       No current pending labs.            Outpatient Follow-Up  No future appointments.    I spent 15 minutes in the professional and overall care of this patient.      Lorraine Evans, APRN-CNM

## 2024-07-01 ENCOUNTER — APPOINTMENT (OUTPATIENT)
Dept: OBSTETRICS AND GYNECOLOGY | Facility: CLINIC | Age: 29
End: 2024-07-01
Payer: COMMERCIAL

## 2024-07-02 NOTE — L&D DELIVERY NOTE
OB Delivery Note  2024  Radha Epstein  29 y.o.   Vaginal, Spontaneous        Gestational Age: 38w3d  /Para:   Quantitative Blood Loss: Admission to Discharge: 100 mL (2024 10:32 PM - 2024 12:15 PM)    Pablo Epstein [[id number]]      Labor Events    Rupture date/time: 2024 2100  Rupture type: Spontaneous  Fluid color: Clear  Labor type: Spontaneous Onset of Labor  Complications: None       Labor Event Times    Dilation complete date/time: 2024  Start pushing date/time: 2024       Labor Length    2nd stage: 0h 13m  3rd stage: 0h 07m       Placenta    Placenta delivery date/time: 2024  Placenta removal: Spontaneous  Placenta appearance: Intact  Placenta disposition: discarded       Cord    Vessels: 3 vessels  Complications: None  Delayed cord clamping?: Yes  Cord blood disposition: Discarded  Gases sent?: No       Anesthesia    Method: Epidural       Operative Delivery    Forceps attempted?: No  Vacuum extractor attempted?: No       Shoulder Dystocia    Shoulder dystocia present?: No       Trade Delivery    Birth date/time: 2024 02:23:00  Delivery type: Vaginal, Spontaneous  Complications: None       Resuscitation    Method: Tactile stimulation       Apgars    Living status: Living  Apgar Component Scores:  1 min.:  5 min.:  10 min.:  15 min.:  20 min.:    Skin color:  0  1       Heart rate:  2  2       Reflex irritability:  2  2       Muscle tone:  2  2       Respiratory effort:  2  2       Total:  8  9       Apgars assigned by: MOHINI COSBY RN       Delivery Providers    Delivering clinician: AMELIA Albarado   Provider Role    Cammie Tovar RN Delivery Nurse    Ida Cosby, RN Nursery Nurse     Resident                     AMELIA Albarado

## 2024-08-05 ENCOUNTER — POSTPARTUM VISIT (OUTPATIENT)
Dept: OBSTETRICS AND GYNECOLOGY | Facility: CLINIC | Age: 29
End: 2024-08-05
Payer: COMMERCIAL

## 2024-08-05 PROCEDURE — 0503F POSTPARTUM CARE VISIT: CPT | Performed by: ADVANCED PRACTICE MIDWIFE

## 2024-08-05 ASSESSMENT — ENCOUNTER SYMPTOMS
HEMATOLOGIC/LYMPHATIC NEGATIVE: 0
EYES NEGATIVE: 0
PSYCHIATRIC NEGATIVE: 0
CARDIOVASCULAR NEGATIVE: 0
NEUROLOGICAL NEGATIVE: 0
GASTROINTESTINAL NEGATIVE: 0
ALLERGIC/IMMUNOLOGIC NEGATIVE: 0
RESPIRATORY NEGATIVE: 0
CONSTITUTIONAL NEGATIVE: 0
MUSCULOSKELETAL NEGATIVE: 0
ENDOCRINE NEGATIVE: 0

## 2024-08-05 ASSESSMENT — EDINBURGH POSTNATAL DEPRESSION SCALE (EPDS)
TOTAL SCORE: 0
I HAVE FELT SAD OR MISERABLE: NO, NOT AT ALL
THE THOUGHT OF HARMING MYSELF HAS OCCURRED TO ME: NEVER
THINGS HAVE BEEN GETTING ON TOP OF ME: NO, I HAVE BEEN COPING AS WELL AS EVER
I HAVE BEEN SO UNHAPPY THAT I HAVE HAD DIFFICULTY SLEEPING: NOT AT ALL
I HAVE BEEN ABLE TO LAUGH AND SEE THE FUNNY SIDE OF THINGS: AS MUCH AS I ALWAYS COULD
I HAVE BEEN SO UNHAPPY THAT I HAVE BEEN CRYING: NO, NEVER
I HAVE LOOKED FORWARD WITH ENJOYMENT TO THINGS: AS MUCH AS I EVER DID
I HAVE BEEN ANXIOUS OR WORRIED FOR NO GOOD REASON: NO, NOT AT ALL
I HAVE FELT SCARED OR PANICKY FOR NO GOOD REASON: NO, NOT AT ALL
I HAVE BLAMED MYSELF UNNECESSARILY WHEN THINGS WENT WRONG: NO, NEVER

## 2024-08-05 NOTE — PROGRESS NOTES
Plan    Advised to call office for breast complaints, abnormal bleeding, mood changes, or other concerning symptoms.   Cleared to resume normal activity as desired  Referral to pelvic floor PT placed per pt request.         AMELIA Milton    Subjective   29 y.o.  presenting for postpartum follow-up     Delivery Date: 24  Gestational Age: 38.3 wks   Type of Delivery: Vaginal, Spontaneous     Pregnancy Problems (from 23 to present)       Problem Noted Resolved    Antepartum transient hypertension ([facility]) 2024 by AMELIA Solis No    Priority:  Medium      Overview Signed 2024  7:39 AM by AMELIA Solis     2024: Isolated elevated BP postpartum. SC           Supervision of other normal pregnancy, antepartum ([facility]) 2024 by AMELIA Milton No    Priority:  Medium      Overview Addendum 6/10/2024  9:00 AM by AMELIA Milton     Desired provider in labor: [x] CNM  [] Physician  [x] Initial BMI: 24.71  [x] Prenatal Labs: wnl  [x] Rh status: positive  [x] Genetic Screening:  RR cfDNA GIRL  [x] Dating confirmation: US at 9.4 Wks on 23    [x] Anatomy US: views initially incomplete--> anatomy wnl though placenta posterior, low-lying, plan for FU at 30 wks--> RESOLVED    [x] 1hr GCT at 24-28wks:  [x] Tdap (27-36wks): done 24  [x] Breastfeeding  [x] Pain management during labor: considering NCB but open to epidural  [x] Postpartum Birth control method: lng IUD  [x] GBS at 36 wks: done 6/10  [x] 39 weeks discussion of IOL vs. Expectant management: 39.0 wk IOL  [x] Mode of delivery:  vaginal         Low-lying placenta ([facility]) 2024 by AMELIA Milton 2024 by Efraín Nguyen MD    Priority:  Medium      Overview Addendum 2024  9:51 AM by Presley Infante, JERSEY-JANNAM     Posterior, 1.2 cm from internal os  Plan for follow-up at 30 weeks--> RESOLVED!                  Concerns: none     Pain: controlled  Lacerations: none  Lochia: x2 weeks, no bleeding since then   Sexual Intimacy: No  Contraceptive Method:  condoms though considering IUD  Feeding Method:  exclusively formula feeding d/t reflux    Lactation Consult Needed?: No    Birth Trauma: No  Bonding with Baby: well with baby girl, Pablo 8#9  Mood:   Postpartum Depression: Low Risk  (2024)    [place]  Depression Scale     Last EPDS Total Score: 0     Last EPDS Self Harm Result: Never       Last pap: 2022 NILM  Objective    LMP 10/03/2023 (Within Days)   Breastfeeding No    Physical Exam  Constitutional:       Appearance: Normal appearance.   Pulmonary:      Effort: Pulmonary effort is normal.   Abdominal:      General: Abdomen is flat. There is no distension.      Palpations: Abdomen is soft. There is no mass.      Tenderness: There is no abdominal tenderness. There is no guarding or rebound.      Hernia: No hernia is present.   Neurological:      Mental Status: She is alert.   Skin:     General: Skin is warm and dry.   Psychiatric:         Mood and Affect: Mood normal.         Behavior: Behavior normal.   Vitals reviewed.